# Patient Record
Sex: FEMALE | Race: WHITE | NOT HISPANIC OR LATINO | URBAN - METROPOLITAN AREA
[De-identification: names, ages, dates, MRNs, and addresses within clinical notes are randomized per-mention and may not be internally consistent; named-entity substitution may affect disease eponyms.]

---

## 2023-08-29 PROBLEM — R93.89 ENDOMETRIAL THICKENING ON ULTRASOUND: Status: ACTIVE | Noted: 2023-08-29

## 2023-08-29 PROBLEM — N19 RENAL FAILURE: Status: ACTIVE | Noted: 2023-08-29

## 2023-08-29 PROBLEM — E55.9 VITAMIN D DEFICIENCY: Status: ACTIVE | Noted: 2023-08-29

## 2023-08-29 PROBLEM — G37.9 DEMYELINATING DISEASE OF CENTRAL NERVOUS SYSTEM (MULTI): Status: ACTIVE | Noted: 2023-08-29

## 2023-08-29 PROBLEM — R41.3 MEMORY LOSS: Status: ACTIVE | Noted: 2023-08-29

## 2023-08-29 PROBLEM — M79.676 PAIN IN TOE: Status: ACTIVE | Noted: 2023-08-29

## 2023-08-29 PROBLEM — M10.9 GOUT: Status: ACTIVE | Noted: 2023-08-29

## 2023-08-29 PROBLEM — I63.9 CEREBROVASCULAR ACCIDENT (MULTI): Status: ACTIVE | Noted: 2023-08-29

## 2023-08-29 PROBLEM — N64.4 MASTODYNIA: Status: ACTIVE | Noted: 2023-08-29

## 2023-08-29 PROBLEM — E78.2 MIXED HYPERLIPIDEMIA: Status: ACTIVE | Noted: 2023-08-29

## 2023-08-29 PROBLEM — G43.009 COMMON MIGRAINE WITHOUT AURA: Status: ACTIVE | Noted: 2023-08-29

## 2023-08-29 PROBLEM — I10 ESSENTIAL (PRIMARY) HYPERTENSION: Status: ACTIVE | Noted: 2023-08-29

## 2023-08-29 PROBLEM — M54.50 LOWER BACK PAIN: Status: ACTIVE | Noted: 2023-08-29

## 2023-08-29 PROBLEM — N95.0 POSTMENOPAUSAL BLEEDING: Status: ACTIVE | Noted: 2023-08-29

## 2023-08-29 PROBLEM — Z04.9 CONDITION NOT FOUND: Status: ACTIVE | Noted: 2023-08-29

## 2023-08-29 PROBLEM — E11.65 TYPE 2 DIABETES MELLITUS WITH HYPERGLYCEMIA (MULTI): Status: ACTIVE | Noted: 2023-08-29

## 2023-08-29 RX ORDER — METFORMIN HYDROCHLORIDE 500 MG/1
500 TABLET ORAL
COMMUNITY
End: 2023-12-11 | Stop reason: WASHOUT

## 2023-08-29 RX ORDER — ALLOPURINOL 100 MG/1
100 TABLET ORAL DAILY
COMMUNITY
End: 2024-03-19 | Stop reason: WASHOUT

## 2023-08-29 RX ORDER — FUROSEMIDE 40 MG/1
40 TABLET ORAL DAILY
COMMUNITY
End: 2023-12-11 | Stop reason: WASHOUT

## 2023-08-29 RX ORDER — PIOGLITAZONEHYDROCHLORIDE 30 MG/1
TABLET ORAL
COMMUNITY
End: 2023-12-11 | Stop reason: WASHOUT

## 2023-08-29 RX ORDER — LANOLIN ALCOHOL/MO/W.PET/CERES
1 CREAM (GRAM) TOPICAL
COMMUNITY

## 2023-08-29 RX ORDER — CAPTOPRIL 12.5 MG/1
25 TABLET ORAL DAILY
COMMUNITY
Start: 2010-03-15

## 2023-08-29 RX ORDER — LOSARTAN POTASSIUM 100 MG/1
100 TABLET ORAL DAILY
COMMUNITY
End: 2023-12-11 | Stop reason: WASHOUT

## 2023-08-29 RX ORDER — ATORVASTATIN CALCIUM 40 MG/1
40 TABLET, FILM COATED ORAL DAILY
COMMUNITY
End: 2023-12-11 | Stop reason: WASHOUT

## 2023-08-29 RX ORDER — INSULIN GLARGINE 100 [IU]/ML
INJECTION, SOLUTION SUBCUTANEOUS
COMMUNITY
End: 2023-12-11 | Stop reason: WASHOUT

## 2023-08-29 RX ORDER — FERROUS GLUCONATE 324(38)MG
38 TABLET ORAL DAILY
COMMUNITY
End: 2023-12-11 | Stop reason: WASHOUT

## 2023-08-29 RX ORDER — COLCHICINE 0.6 MG/1
0.6 TABLET ORAL DAILY
COMMUNITY

## 2023-08-29 RX ORDER — FERROUS SULFATE 324(65)MG
1 TABLET, DELAYED RELEASE (ENTERIC COATED) ORAL DAILY
COMMUNITY
End: 2023-12-11 | Stop reason: WASHOUT

## 2023-08-29 RX ORDER — GLIMEPIRIDE 2 MG/1
2 TABLET ORAL 2 TIMES DAILY
COMMUNITY
End: 2023-12-11 | Stop reason: WASHOUT

## 2023-08-29 RX ORDER — OXYCODONE AND ACETAMINOPHEN 5; 325 MG/1; MG/1
1-2 TABLET ORAL EVERY 4 HOURS PRN
COMMUNITY

## 2023-08-29 RX ORDER — PIOGLITAZONEHYDROCHLORIDE 15 MG/1
15 TABLET ORAL DAILY
COMMUNITY
End: 2023-12-11 | Stop reason: WASHOUT

## 2023-08-29 RX ORDER — HYDROCODONE BITARTRATE AND ACETAMINOPHEN 5; 325 MG/1; MG/1
1 TABLET ORAL EVERY 8 HOURS PRN
COMMUNITY

## 2023-08-29 RX ORDER — FOLIC ACID 0.8 MG
1 TABLET ORAL DAILY
COMMUNITY

## 2023-08-29 RX ORDER — ASCORBIC ACID 500 MG
500 TABLET ORAL DAILY
COMMUNITY

## 2023-08-29 RX ORDER — SEMAGLUTIDE 1.34 MG/ML
0.5 INJECTION, SOLUTION SUBCUTANEOUS
COMMUNITY
Start: 2023-03-13 | End: 2023-12-11 | Stop reason: WASHOUT

## 2023-08-29 RX ORDER — PRAVASTATIN SODIUM 10 MG/1
10 TABLET ORAL DAILY
COMMUNITY
End: 2023-12-11 | Stop reason: WASHOUT

## 2023-08-29 RX ORDER — ACETAMINOPHEN 500 MG
1 TABLET ORAL DAILY
COMMUNITY

## 2023-08-29 RX ORDER — CLONIDINE HYDROCHLORIDE 0.2 MG/1
0.2 TABLET ORAL DAILY
COMMUNITY
Start: 2014-07-15 | End: 2024-03-19 | Stop reason: WASHOUT

## 2023-08-29 RX ORDER — METOPROLOL TARTRATE 50 MG/1
50 TABLET ORAL 2 TIMES DAILY
COMMUNITY
End: 2023-12-11 | Stop reason: WASHOUT

## 2023-08-29 RX ORDER — PEN NEEDLE, DIABETIC 30 GX3/16"
NEEDLE, DISPOSABLE MISCELLANEOUS
COMMUNITY

## 2023-08-29 RX ORDER — HYDRALAZINE HYDROCHLORIDE 25 MG/1
25 TABLET, FILM COATED ORAL 2 TIMES DAILY
COMMUNITY

## 2023-08-29 RX ORDER — BLOOD-GLUCOSE METER
EACH MISCELLANEOUS
COMMUNITY
Start: 2020-11-09

## 2023-08-29 RX ORDER — SUMATRIPTAN SUCCINATE 100 MG/1
100 TABLET ORAL
COMMUNITY
Start: 2014-07-15

## 2023-08-29 RX ORDER — DAPAGLIFLOZIN 10 MG/1
10 TABLET, FILM COATED ORAL DAILY
COMMUNITY
Start: 2023-03-13 | End: 2023-12-11 | Stop reason: WASHOUT

## 2023-08-29 RX ORDER — OMEPRAZOLE 40 MG/1
1 CAPSULE, DELAYED RELEASE ORAL DAILY
COMMUNITY

## 2023-08-29 RX ORDER — INSULIN LISPRO 100 [IU]/ML
INJECTION, SOLUTION INTRAVENOUS; SUBCUTANEOUS
COMMUNITY
End: 2023-12-11 | Stop reason: WASHOUT

## 2023-09-06 ENCOUNTER — HOSPITAL ENCOUNTER (OUTPATIENT)
Dept: DATA CONVERSION | Facility: HOSPITAL | Age: 63
Discharge: HOME | End: 2023-09-06
Payer: COMMERCIAL

## 2023-09-06 DIAGNOSIS — N64.4 MASTODYNIA: ICD-10-CM

## 2023-10-16 ENCOUNTER — TELEPHONE (OUTPATIENT)
Dept: ENDOCRINOLOGY | Facility: CLINIC | Age: 63
End: 2023-10-16
Payer: COMMERCIAL

## 2023-10-16 NOTE — TELEPHONE ENCOUNTER
Kathy KAUFFMAN [place]   1960   [id number]   [phone]     Called and spoke to patient in regards to canceling and rescheduling appt. However, patient direct me to call Emely [phone] to reschedule appt with but she was not available. Left a voice message for her to follow up with the office for scheduling an appt.

## 2023-11-10 ENCOUNTER — PHARMACY VISIT (OUTPATIENT)
Dept: PHARMACY | Facility: CLINIC | Age: 63
End: 2023-11-10
Payer: COMMERCIAL

## 2023-11-10 PROCEDURE — RXMED WILLOW AMBULATORY MEDICATION CHARGE

## 2023-11-10 RX ORDER — LOSARTAN POTASSIUM 100 MG/1
100 TABLET ORAL DAILY
Qty: 90 TABLET | Refills: 2 | Status: CANCELLED | OUTPATIENT
Start: 2023-11-10 | End: 2024-11-09

## 2023-11-13 DIAGNOSIS — N18.4 CHRONIC KIDNEY DISEASE, STAGE 4 (SEVERE) (MULTI): Primary | ICD-10-CM

## 2023-11-14 ENCOUNTER — LAB (OUTPATIENT)
Dept: LAB | Facility: LAB | Age: 63
End: 2023-11-14
Payer: COMMERCIAL

## 2023-11-14 DIAGNOSIS — N18.4 CHRONIC KIDNEY DISEASE, STAGE 4 (SEVERE) (MULTI): ICD-10-CM

## 2023-11-14 LAB
ALBUMIN SERPL BCP-MCNC: 3.9 G/DL (ref 3.4–5)
ANION GAP SERPL CALC-SCNC: 18 MMOL/L (ref 10–20)
BUN SERPL-MCNC: 27 MG/DL (ref 6–23)
CALCIUM SERPL-MCNC: 9.5 MG/DL (ref 8.6–10.3)
CHLORIDE SERPL-SCNC: 97 MMOL/L (ref 98–107)
CO2 SERPL-SCNC: 29 MMOL/L (ref 21–32)
CREAT SERPL-MCNC: 2.4 MG/DL (ref 0.5–1.05)
ERYTHROCYTE [DISTWIDTH] IN BLOOD BY AUTOMATED COUNT: 14.2 % (ref 11.5–14.5)
GFR SERPL CREATININE-BSD FRML MDRD: 22 ML/MIN/1.73M*2
GLUCOSE SERPL-MCNC: 262 MG/DL (ref 74–99)
HCT VFR BLD AUTO: 34.8 % (ref 36–46)
HGB BLD-MCNC: 11 G/DL (ref 12–16)
MCH RBC QN AUTO: 28.3 PG (ref 26–34)
MCHC RBC AUTO-ENTMCNC: 31.6 G/DL (ref 32–36)
MCV RBC AUTO: 90 FL (ref 80–100)
NRBC BLD-RTO: 0 /100 WBCS (ref 0–0)
PHOSPHATE SERPL-MCNC: 4 MG/DL (ref 2.5–4.9)
PLATELET # BLD AUTO: 197 X10*3/UL (ref 150–450)
POTASSIUM SERPL-SCNC: 4.5 MMOL/L (ref 3.5–5.3)
RBC # BLD AUTO: 3.89 X10*6/UL (ref 4–5.2)
SODIUM SERPL-SCNC: 139 MMOL/L (ref 136–145)
WBC # BLD AUTO: 7.5 X10*3/UL (ref 4.4–11.3)

## 2023-11-14 PROCEDURE — 36415 COLL VENOUS BLD VENIPUNCTURE: CPT

## 2023-11-14 PROCEDURE — 80069 RENAL FUNCTION PANEL: CPT

## 2023-11-14 PROCEDURE — 85027 COMPLETE CBC AUTOMATED: CPT

## 2023-11-17 ENCOUNTER — PHARMACY VISIT (OUTPATIENT)
Dept: PHARMACY | Facility: CLINIC | Age: 63
End: 2023-11-17
Payer: COMMERCIAL

## 2023-11-17 PROCEDURE — RXMED WILLOW AMBULATORY MEDICATION CHARGE

## 2023-11-17 PROCEDURE — RXOTC WILLOW AMBULATORY OTC CHARGE

## 2023-11-17 RX ORDER — LOSARTAN POTASSIUM 100 MG/1
100 TABLET ORAL DAILY
Qty: 90 TABLET | Refills: 2 | OUTPATIENT
Start: 2023-11-17

## 2023-11-22 ENCOUNTER — APPOINTMENT (OUTPATIENT)
Dept: ENDOCRINOLOGY | Facility: CLINIC | Age: 63
End: 2023-11-22
Payer: COMMERCIAL

## 2023-11-28 ENCOUNTER — APPOINTMENT (OUTPATIENT)
Dept: ENDOCRINOLOGY | Facility: CLINIC | Age: 63
End: 2023-11-28
Payer: COMMERCIAL

## 2023-12-06 PROCEDURE — RXMED WILLOW AMBULATORY MEDICATION CHARGE

## 2023-12-11 ENCOUNTER — CLINICAL SUPPORT (OUTPATIENT)
Dept: ENDOCRINOLOGY | Facility: CLINIC | Age: 63
End: 2023-12-11
Payer: COMMERCIAL

## 2023-12-11 VITALS
HEART RATE: 72 BPM | SYSTOLIC BLOOD PRESSURE: 133 MMHG | BODY MASS INDEX: 47.88 KG/M2 | WEIGHT: 266 LBS | DIASTOLIC BLOOD PRESSURE: 68 MMHG

## 2023-12-11 DIAGNOSIS — E78.2 MIXED HYPERLIPIDEMIA: ICD-10-CM

## 2023-12-11 DIAGNOSIS — I10 ESSENTIAL (PRIMARY) HYPERTENSION: ICD-10-CM

## 2023-12-11 DIAGNOSIS — E11.65 TYPE 2 DIABETES MELLITUS WITH HYPERGLYCEMIA, WITH LONG-TERM CURRENT USE OF INSULIN (MULTI): Primary | ICD-10-CM

## 2023-12-11 DIAGNOSIS — Z79.4 TYPE 2 DIABETES MELLITUS WITH HYPERGLYCEMIA, WITH LONG-TERM CURRENT USE OF INSULIN (MULTI): Primary | ICD-10-CM

## 2023-12-11 LAB — POC HEMOGLOBIN A1C: 11.5 % (ref 4.2–6.5)

## 2023-12-11 PROCEDURE — 99214 OFFICE O/P EST MOD 30 MIN: CPT | Performed by: INTERNAL MEDICINE

## 2023-12-11 PROCEDURE — RXMED WILLOW AMBULATORY MEDICATION CHARGE

## 2023-12-11 PROCEDURE — 83036 HEMOGLOBIN GLYCOSYLATED A1C: CPT | Performed by: INTERNAL MEDICINE

## 2023-12-11 PROCEDURE — 95251 CONT GLUC MNTR ANALYSIS I&R: CPT | Performed by: INTERNAL MEDICINE

## 2023-12-11 RX ORDER — SEMAGLUTIDE 1.34 MG/ML
1 INJECTION, SOLUTION SUBCUTANEOUS
Qty: 3 ML | Refills: 5 | Status: SHIPPED | OUTPATIENT
Start: 2023-12-11 | End: 2024-03-19 | Stop reason: DRUGHIGH

## 2023-12-11 NOTE — PATIENT INSTRUCTIONS
Finish up Ozempic 0.5mg,  then increase to 1mg weekly thereafter     Decrease Lantus to 40 units daily    Use Humalog 10 units before meals for the next week or so.  If not having any low sugars,  increase to 15 units before meals.  If still not having lows,  increase to 20 units before meals     AVOID SUGAR SWEETENED BEVERAGES,  diet/zero products are okay    Scan martinez often - when you wake up, before/after meals, bedtime, etc.   Follow scans closely, learning from scans which meals/snacks cause higher blood sugars or may require more/less mealtime insulin

## 2023-12-11 NOTE — PROGRESS NOTES
I, Dr Luisito Puente, have reviewed this progress note, medication list, vital signs, any pertinent lab values, and any CGM data if present with the [facility] and  face to face during this visit today. This note reflects the treatment plan that was made under my direction after reviewing the above mentioned elements while face to face with the patient and CDE.  I personally answered and addressed any questions and concerns the patient had during the visit today.  The CDE entered the data in this note under my direction and I personally reviewed it, signed any lab or medication orders that I instructed to be completed. I am the billing provider for this visit and the level of service was determined by my involvement in the Medical Decision Making Component of this visit while face to face with the patient.    Luisito Puente MD

## 2023-12-11 NOTE — PROGRESS NOTES
HPI     64 yo with dm2 (ckd, egfr-21 July 2023), htn, dyslipidemia, hemorrhagic stroke, eyesight better s/p cataract surgery both eyes since last visit.  Presents for follow up with  today.  A1c-11.5% today,  11.9% last visit.  Testing sugars 4 times/day, martinez 2 use. Rare low sugars. Able to afford meds. Struggling watching carbs in diet, trying to staying active around the house.         -has been drinking regular pop often 2 cans/day still           Taking Lantus 50 units qhs, humalog 40 units at meals -still hasn't been taking states d/t fear of lows, sonja 15mg (1/2) daily.  Taking ozempic 0.5mg weekly started last visit,  tolerating.  Does not tolerate metformin.  Does not recall using sglt2-i.           30 day libre2 data: 18% in range, 0% low, pattern: mid 200's overnight, upper 100's on waking, after eating 300's through the day, sugars can vary 200 pts at anytime.   Issue is diet and taking insulin with meals.  Dependant on food stamps and often eats whatever she is able to get affecting her sugars.           Pt taking atorvastatin 40mg for lipids and tolerating.           Pt on multiple bp meds (losartan 100mg, toprol 50mg bid, clonidine 0.2mg tid, lasix 40mg qd).           July 2023 sorian labs: hct-34.6%, cr-2.5 (egfr-21)        Current Outpatient Medications:     allopurinol (Zyloprim) 100 mg tablet, Take 1 tablet (100 mg) by mouth once daily., Disp: , Rfl:     ascorbic acid (Vitamin C) 500 mg tablet, Take 1 tablet (500 mg) by mouth once daily., Disp: , Rfl:     aspirin 81 mg capsule, Take 1 tablet by mouth once daily., Disp: , Rfl:     atorvastatin (Lipitor) 40 mg tablet, TAKE 1 TABLET BY MOUTH ONE TIME DAILY, Disp: 90 tablet, Rfl: 1    blood sugar diagnostic (OneTouch Verio test strips) strip, as directed 4 times daily, Disp: , Rfl:     captopril (Capoten) 12.5 mg tablet, Take 2 tablets (25 mg) by mouth once daily., Disp: , Rfl:     cholecalciferol (Vitamin D3) 50 mcg (2,000 unit) capsule,  Take 1 capsule (50 mcg) by mouth early in the morning.., Disp: , Rfl:     ciprofloxacin (Ciloxan) 0.3 % ophthalmic solution, PLACE 1 DROP INTO LEFT EYE 4 TIMES A DAY STARTING THE MORNING OF SURGERY, THEN AS DIRECTED AFTER SURGERY, Disp: 5 mL, Rfl: 1    cloNIDine (Catapres) 0.2 mg tablet, Take 1 tablet (0.2 mg) by mouth once daily., Disp: , Rfl:     colchicine 0.6 mg tablet, Take 1 tablet (0.6 mg) by mouth once daily., Disp: , Rfl:     cyanocobalamin (Vitamin B-12) 1,000 mcg tablet, Take 1 tablet (1,000 mcg) by mouth every other day., Disp: , Rfl:     docosahexaenoic acid/epa (FISH OIL ORAL), Take 1 tablet by mouth once daily. Until 3/19, then 2 tablets daily, Disp: , Rfl:     maria elena primrose/linoleic/g-lenic (PRIMROSE OIL ORAL), as directed, Disp: , Rfl:     ferrous gluconate (Fergon) 324 (38 Fe) mg tablet, TAKE 1 TABLET BY MOUTH ONCE DAILY IN THE MORNING. TAKE WITH MEALS, Disp: 30 tablet, Rfl: 11    folic acid (Folvite) 800 mcg tablet, Take 1 tablet (800 mcg) by mouth once daily., Disp: , Rfl:     furosemide (Lasix) 40 mg tablet, TAKE 1 TABLET BY MOUTH ONE TIME DAILY, Disp: 90 tablet, Rfl: 3    hydrALAZINE (Apresoline) 25 mg tablet, Take 1 tablet (25 mg) by mouth 2 times a day., Disp: , Rfl:     HYDROcodone-acetaminophen (Norco) 5-325 mg tablet, Take 1 tablet by mouth every 8 hours if needed (pain)., Disp: , Rfl:     insulin glargine (Lantus) 100 unit/mL (3 mL) pen, INJECT SUBCUTANEOUSLY UP TO 50 UNITS DAILY, Disp: 45 mL, Rfl: 1    insulin lispro (HumaLOG) 100 unit/mL injection, INJECT UP TO 50 UNITS SUBCUTANEOUSLY THREE TIMES A DAY, Disp: 135 mL, Rfl: 1    ketorolac (Acular) 0.5 % ophthalmic solution, PLACE 1 DROP INTO LEFT EYE 2 TIMES A DAY STARTING THE MORNING OF SURGERY, THEN AS DIRECTED AFTER SURGERY, Disp: 10 mL, Rfl: 1    losartan (Cozaar) 100 mg tablet, Take 1 tablet (100 mg) by mouth once daily., Disp: 90 tablet, Rfl: 2    metoprolol succinate XL (Toprol-XL) 50 mg 24 hr tablet, TAKE 1 TABLET BY MOUTH TWO  "TIMES A DAY, Disp: 180 tablet, Rfl: 1    omeprazole (PriLOSEC) 40 mg DR capsule, Take 1 capsule (40 mg) by mouth once daily., Disp: , Rfl:     ONETOUCH DELICA LANCETS MISC, use as directed 4 times daily, Disp: , Rfl:     oxyCODONE-acetaminophen (Percocet) 5-325 mg tablet, Take 1-2 tablets by mouth every 4 hours if needed (pain)., Disp: , Rfl:     pen needle, diabetic 32 gauge x 5/32\" needle, USE AS DIRECTED TO INJECT INSULIN FOUR TIMES A DAY, Disp: , Rfl:     pioglitazone (Actos) 15 mg tablet, TAKE 1/2 TABLET BY MOUTH ONE TIME DAILY, Disp: 45 tablet, Rfl: 1    prednisoLONE acetate (Pred-Forte) 1 % ophthalmic suspension, PLACE 1 DROP INTO LEFT EYE 4 TIMES A DAY STARTING THE MORNING OF SURGERY, THEN AS DIRECTED AFTER SURGERY, Disp: 10 mL, Rfl: 1    semaglutide 0.25 mg or 0.5 mg (2 mg/3 mL) pen injector, INJECT 0.5MG SUBCUTANEOUSLY EVERY WEEK, Disp: 3 mL, Rfl: 4    SUMAtriptan (Imitrex) 100 mg tablet, Take 1 tablet (100 mg) by mouth. At onset of migraine headache. Repeat in 2 hours if needed, Disp: , Rfl:       Allergies as of 12/11/2023 - Reviewed 12/11/2023   Allergen Reaction Noted    Morphine Other and Unknown 08/29/2023         /68   Pulse 72   Wt 121 kg (266 lb)   BMI 47.88 kg/m²       Labs:  Lab Results   Component Value Date    WBC 7.5 11/14/2023    NRBC 0.0 11/14/2023    RBC 3.89 (L) 11/14/2023    HGB 11.0 (L) 11/14/2023    HCT 34.8 (L) 11/14/2023     11/14/2023     Lab Results   Component Value Date    CALCIUM 9.5 11/14/2023    AST 17 10/06/2022    ALKPHOS 109 10/06/2022    BILITOT 0.5 10/06/2022    PROT 7.0 10/06/2022    ALBUMIN 3.9 11/14/2023    GLOB 3.2 05/03/2022    AGR 1.3 (L) 05/03/2022     11/14/2023    K 4.5 11/14/2023    CL 97 (L) 11/14/2023    CO2 29 11/14/2023    ANIONGAP 18 11/14/2023    BUN 27 (H) 11/14/2023    CREATININE 2.40 (H) 11/14/2023    UREACREAUR 11.2 07/27/2023    GLUCOSE 262 (H) 11/14/2023    ALT 22 10/06/2022    EGFR 22 (L) 11/14/2023     Lab Results   Component " "Value Date    CHOL 164 05/03/2022    TRIG 174 (H) 05/03/2022    HDL 46 (L) 05/03/2022    LDLCALC 83 05/03/2022     Lab Results   Component Value Date    MICROALBCREA 15.1 05/03/2022     No results found for: \"TSH\"  Lab Results   Component Value Date    PEODLTQM27 1,067 (H) 10/06/2022     Lab Results   Component Value Date    HGBA1C 11.5 (A) 12/11/2023         Assessment/Plan   1. Type 2 diabetes mellitus with hyperglycemia, with long-term current use of insulin (CMS/[facility])  Continue to uptitrate weekly glp1    Needs to take bolus insulin,  lower dose to help feel more comfortable dosing at meals      Decrease basal insulin to 40 units     - POCT glycosylated hemoglobin (Hb A1C) manually resulted    2. Essential (primary) hypertension  At target on therapy,  following with nephrology on regular basis     3. Mixed hyperlipidemia  On statin, tolerating       Treatment and plan discussed with Dr. Puente.  CHAY Meredith, PharmD, BC-ADM, [facility].   "

## 2023-12-14 ENCOUNTER — PHARMACY VISIT (OUTPATIENT)
Dept: PHARMACY | Facility: CLINIC | Age: 63
End: 2023-12-14
Payer: COMMERCIAL

## 2023-12-14 PROCEDURE — RXMED WILLOW AMBULATORY MEDICATION CHARGE

## 2023-12-16 RX ORDER — METOPROLOL SUCCINATE 50 MG/1
50 TABLET, EXTENDED RELEASE ORAL 2 TIMES DAILY
Qty: 180 TABLET | Refills: 1 | Status: CANCELLED | OUTPATIENT
Start: 2023-12-16 | End: 2024-12-15

## 2024-01-02 PROCEDURE — RXMED WILLOW AMBULATORY MEDICATION CHARGE

## 2024-01-04 ENCOUNTER — PHARMACY VISIT (OUTPATIENT)
Dept: PHARMACY | Facility: CLINIC | Age: 64
End: 2024-01-04
Payer: COMMERCIAL

## 2024-01-04 PROCEDURE — RXMED WILLOW AMBULATORY MEDICATION CHARGE

## 2024-01-30 DIAGNOSIS — E11.65 TYPE 2 DIABETES MELLITUS WITH HYPERGLYCEMIA, WITH LONG-TERM CURRENT USE OF INSULIN (MULTI): Primary | ICD-10-CM

## 2024-01-30 DIAGNOSIS — Z79.4 TYPE 2 DIABETES MELLITUS WITH HYPERGLYCEMIA, WITH LONG-TERM CURRENT USE OF INSULIN (MULTI): Primary | ICD-10-CM

## 2024-01-30 PROCEDURE — RXMED WILLOW AMBULATORY MEDICATION CHARGE

## 2024-01-30 RX ORDER — METOPROLOL SUCCINATE 50 MG/1
50 TABLET, EXTENDED RELEASE ORAL 2 TIMES DAILY
Qty: 180 TABLET | Refills: 1 | Status: CANCELLED | OUTPATIENT
Start: 2024-01-30 | End: 2025-01-29

## 2024-01-30 RX ORDER — PIOGLITAZONEHYDROCHLORIDE 15 MG/1
TABLET ORAL DAILY
Qty: 45 TABLET | Refills: 1 | Status: SHIPPED | OUTPATIENT
Start: 2024-01-30 | End: 2025-01-29

## 2024-01-30 RX ORDER — PIOGLITAZONEHYDROCHLORIDE 15 MG/1
TABLET ORAL DAILY
Qty: 45 TABLET | Refills: 1 | Status: CANCELLED | OUTPATIENT
Start: 2024-01-30 | End: 2025-01-29

## 2024-01-30 RX ORDER — ALLOPURINOL 100 MG/1
100 TABLET ORAL DAILY
Qty: 90 TABLET | Refills: 1 | Status: CANCELLED | OUTPATIENT
Start: 2024-01-30 | End: 2025-01-29

## 2024-01-31 ENCOUNTER — PHARMACY VISIT (OUTPATIENT)
Dept: PHARMACY | Facility: CLINIC | Age: 64
End: 2024-01-31
Payer: COMMERCIAL

## 2024-01-31 PROCEDURE — RXMED WILLOW AMBULATORY MEDICATION CHARGE

## 2024-02-29 PROCEDURE — RXMED WILLOW AMBULATORY MEDICATION CHARGE

## 2024-03-01 ENCOUNTER — PHARMACY VISIT (OUTPATIENT)
Dept: PHARMACY | Facility: CLINIC | Age: 64
End: 2024-03-01
Payer: COMMERCIAL

## 2024-03-01 PROCEDURE — RXMED WILLOW AMBULATORY MEDICATION CHARGE

## 2024-03-06 DIAGNOSIS — Z79.4 TYPE 2 DIABETES MELLITUS WITH HYPERGLYCEMIA, WITH LONG-TERM CURRENT USE OF INSULIN (MULTI): Primary | ICD-10-CM

## 2024-03-06 DIAGNOSIS — E11.65 TYPE 2 DIABETES MELLITUS WITH HYPERGLYCEMIA, WITH LONG-TERM CURRENT USE OF INSULIN (MULTI): Primary | ICD-10-CM

## 2024-03-06 PROCEDURE — RXMED WILLOW AMBULATORY MEDICATION CHARGE

## 2024-03-06 RX ORDER — INSULIN LISPRO 100 [IU]/ML
INJECTION, SOLUTION INTRAVENOUS; SUBCUTANEOUS
Qty: 135 ML | Refills: 1 | Status: SHIPPED | OUTPATIENT
Start: 2024-03-06 | End: 2025-03-06

## 2024-03-06 RX ORDER — INSULIN GLARGINE 100 [IU]/ML
INJECTION, SOLUTION SUBCUTANEOUS
Qty: 45 ML | Refills: 1 | Status: SHIPPED | OUTPATIENT
Start: 2024-03-06 | End: 2025-03-06

## 2024-03-06 RX ORDER — ATORVASTATIN CALCIUM 40 MG/1
40 TABLET, FILM COATED ORAL DAILY
Qty: 90 TABLET | Refills: 1 | Status: SHIPPED | OUTPATIENT
Start: 2024-03-06 | End: 2025-03-06

## 2024-03-07 ENCOUNTER — PHARMACY VISIT (OUTPATIENT)
Dept: PHARMACY | Facility: CLINIC | Age: 64
End: 2024-03-07
Payer: COMMERCIAL

## 2024-03-09 PROCEDURE — RXMED WILLOW AMBULATORY MEDICATION CHARGE

## 2024-03-11 ENCOUNTER — APPOINTMENT (OUTPATIENT)
Dept: ENDOCRINOLOGY | Facility: CLINIC | Age: 64
End: 2024-03-11
Payer: COMMERCIAL

## 2024-03-12 ENCOUNTER — PHARMACY VISIT (OUTPATIENT)
Dept: PHARMACY | Facility: CLINIC | Age: 64
End: 2024-03-12
Payer: COMMERCIAL

## 2024-03-18 ENCOUNTER — LAB (OUTPATIENT)
Dept: LAB | Facility: LAB | Age: 64
End: 2024-03-18
Payer: COMMERCIAL

## 2024-03-18 DIAGNOSIS — N18.4 CHRONIC KIDNEY DISEASE, STAGE 4 (SEVERE) (MULTI): Primary | ICD-10-CM

## 2024-03-18 LAB
ALBUMIN SERPL BCP-MCNC: 3.7 G/DL (ref 3.4–5)
ANION GAP SERPL CALC-SCNC: 22 MMOL/L (ref 10–20)
BUN SERPL-MCNC: 22 MG/DL (ref 6–23)
CALCIUM SERPL-MCNC: 9.1 MG/DL (ref 8.6–10.3)
CHLORIDE SERPL-SCNC: 94 MMOL/L (ref 98–107)
CO2 SERPL-SCNC: 25 MMOL/L (ref 21–32)
CREAT SERPL-MCNC: 2.5 MG/DL (ref 0.5–1.05)
EGFRCR SERPLBLD CKD-EPI 2021: 21 ML/MIN/1.73M*2
ERYTHROCYTE [DISTWIDTH] IN BLOOD BY AUTOMATED COUNT: 13.2 % (ref 11.5–14.5)
GLUCOSE SERPL-MCNC: 382 MG/DL (ref 74–99)
HCT VFR BLD AUTO: 37.1 % (ref 36–46)
HGB BLD-MCNC: 11.6 G/DL (ref 12–16)
MCH RBC QN AUTO: 29.4 PG (ref 26–34)
MCHC RBC AUTO-ENTMCNC: 31.3 G/DL (ref 32–36)
MCV RBC AUTO: 94 FL (ref 80–100)
NRBC BLD-RTO: 0 /100 WBCS (ref 0–0)
PHOSPHATE SERPL-MCNC: 3.7 MG/DL (ref 2.5–4.9)
PLATELET # BLD AUTO: 207 X10*3/UL (ref 150–450)
POTASSIUM SERPL-SCNC: 4.6 MMOL/L (ref 3.5–5.3)
RBC # BLD AUTO: 3.95 X10*6/UL (ref 4–5.2)
SODIUM SERPL-SCNC: 136 MMOL/L (ref 136–145)
WBC # BLD AUTO: 6.8 X10*3/UL (ref 4.4–11.3)

## 2024-03-18 PROCEDURE — 83970 ASSAY OF PARATHORMONE: CPT

## 2024-03-18 PROCEDURE — 85027 COMPLETE CBC AUTOMATED: CPT

## 2024-03-18 PROCEDURE — 80069 RENAL FUNCTION PANEL: CPT

## 2024-03-18 PROCEDURE — 36415 COLL VENOUS BLD VENIPUNCTURE: CPT

## 2024-03-19 ENCOUNTER — CLINICAL SUPPORT (OUTPATIENT)
Dept: ENDOCRINOLOGY | Facility: CLINIC | Age: 64
End: 2024-03-19
Payer: COMMERCIAL

## 2024-03-19 VITALS
SYSTOLIC BLOOD PRESSURE: 103 MMHG | DIASTOLIC BLOOD PRESSURE: 68 MMHG | WEIGHT: 257.2 LBS | HEART RATE: 75 BPM | BODY MASS INDEX: 46.29 KG/M2

## 2024-03-19 DIAGNOSIS — I10 ESSENTIAL (PRIMARY) HYPERTENSION: ICD-10-CM

## 2024-03-19 DIAGNOSIS — Z79.4 TYPE 2 DIABETES MELLITUS WITH HYPERGLYCEMIA, WITH LONG-TERM CURRENT USE OF INSULIN (MULTI): Primary | ICD-10-CM

## 2024-03-19 DIAGNOSIS — E78.2 MIXED HYPERLIPIDEMIA: ICD-10-CM

## 2024-03-19 DIAGNOSIS — E11.65 TYPE 2 DIABETES MELLITUS WITH HYPERGLYCEMIA, WITH LONG-TERM CURRENT USE OF INSULIN (MULTI): Primary | ICD-10-CM

## 2024-03-19 LAB
POC HEMOGLOBIN A1C: 13.2 % (ref 4.2–6.5)
PTH-INTACT SERPL-MCNC: 117.5 PG/ML (ref 18.5–88)

## 2024-03-19 PROCEDURE — 83036 HEMOGLOBIN GLYCOSYLATED A1C: CPT | Performed by: INTERNAL MEDICINE

## 2024-03-19 PROCEDURE — 95251 CONT GLUC MNTR ANALYSIS I&R: CPT | Performed by: INTERNAL MEDICINE

## 2024-03-19 PROCEDURE — 99214 OFFICE O/P EST MOD 30 MIN: CPT | Performed by: INTERNAL MEDICINE

## 2024-03-19 RX ORDER — SEMAGLUTIDE 2.68 MG/ML
2 INJECTION, SOLUTION SUBCUTANEOUS
Qty: 9 ML | Refills: 1 | Status: SHIPPED | OUTPATIENT
Start: 2024-03-19

## 2024-03-19 NOTE — PATIENT INSTRUCTIONS
Increase Ozempic to 2mg once weekly   - can use 2 shots 1mg dose pens to use up home supply first     Lantus 40 units once daily    Humalog 30 units BEFORE meals      Call Carlos to request switch to Leana 3     AVOID SUGAR SWEETENED BEVERAGES,  diet/zero products are okay

## 2024-03-19 NOTE — PROGRESS NOTES
HPI     62 yo with dm2 (ckd, egfr-21 July 2023), htn, dyslipidemia, hemorrhagic stroke, eyesight better s/p cataract surgery both eyes since last visit, presents for follow up.  A1c-13.2% today,  11.5% last visit.  Testing sugars 4 times/day, martinez 2 use. Rare low sugars. Able to afford meds. Struggling watching carbs in diet, trying to staying active around the house.  -has been drinking regular pop often 3 cans/day still    Taking Lantus 40 (decr from 50u) units qhs,  sonja 15mg (1/2) daily,  ozempic 1mg weekly incr last visit,  tolerating.    Still infreq taking humalog at meals,  reports typ using 1-2 times/week currently taking 30u before meal if/when remembers  - Does not tolerate metformin.  Does not recall using sglt2-i.    -14 days libre2 data reviewed & attached:  19% in range, 0% low, pattern: mid 200's overnight, upper 100's on waking, after eating 300's through the day, sugars can vary 200 pts at anytime.    - missing scans, infreq wearing sensor states d/t pain upon application ?   - Issue is diet and taking insulin with meals.  Dependant on food stamps and often eats whatever she is able to get affecting her sugars.    Pt taking atorvastatin 40mg for lipids and tolerating.    Pt on multiple bp meds (losartan 100mg, toprol 50mg bid, clonidine 0.2mg tid, lasix 40mg qd).    July 2023 sorian labs: hct-34.6%, cr-2.5 (egfr-21)        Current Outpatient Medications:     allopurinol (Zyloprim) 100 mg tablet, Take 1 tablet (100 mg) by mouth once daily., Disp: 90 tablet, Rfl: 3    ascorbic acid (Vitamin C) 500 mg tablet, Take 1 tablet (500 mg) by mouth once daily., Disp: , Rfl:     atorvastatin (Lipitor) 40 mg tablet, TAKE 1 TABLET BY MOUTH ONE TIME DAILY, Disp: 90 tablet, Rfl: 1    blood sugar diagnostic (OneTouch Verio test strips) strip, as directed 4 times daily, Disp: , Rfl:     captopril (Capoten) 12.5 mg tablet, Take 2 tablets (25 mg) by mouth once daily., Disp: , Rfl:     cholecalciferol (Vitamin D3) 50  mcg (2,000 unit) capsule, Take 1 capsule (50 mcg) by mouth early in the morning.., Disp: , Rfl:     ciprofloxacin (Ciloxan) 0.3 % ophthalmic solution, PLACE 1 DROP INTO LEFT EYE 4 TIMES A DAY STARTING THE MORNING OF SURGERY, THEN AS DIRECTED AFTER SURGERY, Disp: 5 mL, Rfl: 1    cloNIDine (Catapres) 0.2 mg tablet, Take 1 tablet (0.2 mg) by mouth 3 times a day., Disp: 270 tablet, Rfl: 2    colchicine 0.6 mg tablet, Take 1 tablet (0.6 mg) by mouth once daily., Disp: , Rfl:     cyanocobalamin (Vitamin B-12) 1,000 mcg tablet, Take 1 tablet (1,000 mcg) by mouth every other day., Disp: , Rfl:     docosahexaenoic acid/epa (FISH OIL ORAL), Take 1 tablet by mouth once daily. Until 3/19, then 2 tablets daily, Disp: , Rfl:     ferrous gluconate (Fergon) 324 (38 Fe) mg tablet, TAKE 1 TABLET BY MOUTH ONCE DAILY IN THE MORNING. TAKE WITH MEALS, Disp: 30 tablet, Rfl: 11    folic acid (Folvite) 800 mcg tablet, Take 1 tablet (800 mcg) by mouth once daily., Disp: , Rfl:     furosemide (Lasix) 40 mg tablet, TAKE 1 TABLET BY MOUTH ONE TIME DAILY, Disp: 90 tablet, Rfl: 3    hydrALAZINE (Apresoline) 25 mg tablet, Take 1 tablet (25 mg) by mouth 2 times a day., Disp: , Rfl:     HYDROcodone-acetaminophen (Norco) 5-325 mg tablet, Take 1 tablet by mouth every 8 hours if needed (pain)., Disp: , Rfl:     insulin glargine (Lantus Solostar U-100 Insulin) 100 unit/mL (3 mL) pen, INJECT SUBCUTANEOUSLY UP TO 50 UNITS DAILY, Disp: 45 mL, Rfl: 1    insulin lispro (HumaLOG KwikPen Insulin) 100 unit/mL injection, INJECT UP TO 50 UNITS SUBCUTANEOUSLY THREE TIMES A DAY, Disp: 135 mL, Rfl: 1    ketorolac (Acular) 0.5 % ophthalmic solution, PLACE 1 DROP INTO LEFT EYE 2 TIMES A DAY STARTING THE MORNING OF SURGERY, THEN AS DIRECTED AFTER SURGERY, Disp: 10 mL, Rfl: 1    losartan (Cozaar) 100 mg tablet, Take 1 tablet (100 mg) by mouth once daily., Disp: 90 tablet, Rfl: 2    metoprolol succinate XL (Toprol-XL) 50 mg 24 hr tablet, Take 1 tablet (50 mg) by mouth 2  "times a day., Disp: 90 tablet, Rfl: 3    omeprazole (PriLOSEC) 40 mg DR capsule, Take 1 capsule (40 mg) by mouth once daily., Disp: , Rfl:     ONETOUCH DELICA LANCETS MISC, use as directed 4 times daily, Disp: , Rfl:     oxyCODONE-acetaminophen (Percocet) 5-325 mg tablet, Take 1-2 tablets by mouth every 4 hours if needed (pain)., Disp: , Rfl:     pen needle, diabetic 32 gauge x 5/32\" needle, USE AS DIRECTED TO INJECT INSULIN FOUR TIMES A DAY, Disp: , Rfl:     pioglitazone (Actos) 15 mg tablet, TAKE 1/2 TABLET BY MOUTH ONE TIME DAILY, Disp: 45 tablet, Rfl: 1    prednisoLONE acetate (Pred-Forte) 1 % ophthalmic suspension, PLACE 1 DROP INTO LEFT EYE 4 TIMES A DAY STARTING THE MORNING OF SURGERY, THEN AS DIRECTED AFTER SURGERY, Disp: 10 mL, Rfl: 1    semaglutide (Ozempic) 1 mg/dose (4 mg/3 mL) pen injector, Inject 1 mg under the skin 1 (one) time per week., Disp: 3 mL, Rfl: 5    SUMAtriptan (Imitrex) 100 mg tablet, Take 1 tablet (100 mg) by mouth. At onset of migraine headache. Repeat in 2 hours if needed, Disp: , Rfl:     Allergies as of 03/19/2024 - Reviewed 03/19/2024   Allergen Reaction Noted    Morphine Other and Unknown 08/29/2023       /68   Pulse 75   Wt 117 kg (257 lb 3.2 oz)   BMI 46.29 kg/m²     Labs:   Lab Results   Component Value Date    WBC 6.8 03/18/2024    NRBC 0.0 03/18/2024    RBC 3.95 (L) 03/18/2024    HGB 11.6 (L) 03/18/2024    HCT 37.1 03/18/2024     03/18/2024     Lab Results   Component Value Date    CALCIUM 9.1 03/18/2024    AST 17 10/06/2022    ALKPHOS 109 10/06/2022    BILITOT 0.5 10/06/2022    PROT 7.0 10/06/2022    ALBUMIN 3.7 03/18/2024    GLOB 3.2 05/03/2022    AGR 1.3 (L) 05/03/2022     03/18/2024    K 4.6 03/18/2024    CL 94 (L) 03/18/2024    CO2 25 03/18/2024    ANIONGAP 22 (H) 03/18/2024    BUN 22 03/18/2024    CREATININE 2.50 (H) 03/18/2024    UREACREAUR 11.2 07/27/2023    GLUCOSE 382 (H) 03/18/2024    ALT 22 10/06/2022    EGFR 21 (L) 03/18/2024     Lab Results " "  Component Value Date    CHOL 164 05/03/2022    TRIG 174 (H) 05/03/2022    HDL 46 (L) 05/03/2022    LDLCALC 83 05/03/2022     Lab Results   Component Value Date    MICROALBCREA 15.1 05/03/2022     No results found for: \"TSH\"  Lab Results   Component Value Date    [id number] 1,067 (H) 10/06/2022     Lab Results   Component Value Date    HGBA1C 13.2 (A) 03/19/2024       Assessment/Plan   1. Type 2 diabetes mellitus with hyperglycemia, with long-term current use of insulin (CMS/[facility])    -A1c ordered & reviewed today  -reviewed 14d libreview data    As with previous visits,  poor compliance with insulin / diet     Needs to stop regular soda & remember to take bolus insulin with each meal no exceptions     Continue to uptitrate weekly glp1 to 2mg     Would benefit from switch to martinez 3,  [facility]   -Patient has been using a continuous glucose monitor and performing frequent testing (4 or more times daily); she is insulin treated with multiple daily injections and the treatment regimen requires frequent adjustment based on blood sugars. CGM is medically necessary.  Patient is adherent to diabetic treatment plan and participates in ongoing education and support. She is also motivated and knowledgeable about the use of CGM.    2. Essential (primary) hypertension  At target on therapy,  following with nephrology on regular basis     3. Mixed hyperlipidemia  On statin, tolerating       Follow up: 4mon BB    -labs/tests/notes reviewed  -reviewed and counseled patient on medication monitoring and side effects    Treatment and plan discussed with Dr. Puente.  CHAY Meredith, PharmD, BC-ADM, [facility].     Medical Decision Making  Complexity of problem: Chronic illness of diabetes mellitus uncontrolled, progressing  Data analyzed and reviewed: Reviewed prior notes, blood glucose data, labs including HgbA1c, lipids, serum chemistries.  Ordered tests.   Risk of complications and morbidities: Is definite because of " use of insulin and risk of hypoglycemia.  Prescription medications reviewed and modifications made.  Compliance assessed.  Addressed social determinants of health including food insecurity.

## 2024-03-20 PROCEDURE — RXMED WILLOW AMBULATORY MEDICATION CHARGE

## 2024-03-20 NOTE — PROGRESS NOTES
I, Dr Luisito Puente, have reviewed this progress note, medication list, vital signs, any pertinent lab values, and any CGM data if present with the [facility] and  face to face during this visit today. This note reflects the treatment plan that was made under my direction after reviewing the above mentioned elements while face to face with the patient and CDE.  I personally answered and addressed any questions and concerns the patient had during the visit today.  The CDE entered the data in this note under my direction and I personally reviewed it, signed any lab or medication orders that I instructed to be completed. I am the billing provider for this visit and the level of service was determined by my involvement in the Medical Decision Making Component of this visit while face to face with the patient.    Electronically signed by Luisito Puente MD on 3/20/24 at 7:47 AM.

## 2024-03-21 ENCOUNTER — PHARMACY VISIT (OUTPATIENT)
Dept: PHARMACY | Facility: CLINIC | Age: 64
End: 2024-03-21
Payer: COMMERCIAL

## 2024-04-27 PROCEDURE — RXMED WILLOW AMBULATORY MEDICATION CHARGE

## 2024-04-29 ENCOUNTER — PHARMACY VISIT (OUTPATIENT)
Dept: PHARMACY | Facility: CLINIC | Age: 64
End: 2024-04-29
Payer: COMMERCIAL

## 2024-04-29 PROCEDURE — RXMED WILLOW AMBULATORY MEDICATION CHARGE

## 2024-05-08 PROCEDURE — RXMED WILLOW AMBULATORY MEDICATION CHARGE

## 2024-05-09 ENCOUNTER — PHARMACY VISIT (OUTPATIENT)
Dept: PHARMACY | Facility: CLINIC | Age: 64
End: 2024-05-09
Payer: COMMERCIAL

## 2024-06-04 ENCOUNTER — PHARMACY VISIT (OUTPATIENT)
Dept: PHARMACY | Facility: CLINIC | Age: 64
End: 2024-06-04
Payer: COMMERCIAL

## 2024-06-04 PROCEDURE — RXMED WILLOW AMBULATORY MEDICATION CHARGE

## 2024-06-04 RX ORDER — FUROSEMIDE 40 MG/1
40 TABLET ORAL DAILY
Qty: 90 TABLET | Refills: 3 | Status: CANCELLED | OUTPATIENT
Start: 2024-06-04 | End: 2025-06-04

## 2024-06-05 ENCOUNTER — PHARMACY VISIT (OUTPATIENT)
Dept: PHARMACY | Facility: CLINIC | Age: 64
End: 2024-06-05

## 2024-06-06 PROCEDURE — RXMED WILLOW AMBULATORY MEDICATION CHARGE

## 2024-06-08 ENCOUNTER — PHARMACY VISIT (OUTPATIENT)
Dept: PHARMACY | Facility: CLINIC | Age: 64
End: 2024-06-08
Payer: COMMERCIAL

## 2024-06-11 PROCEDURE — RXMED WILLOW AMBULATORY MEDICATION CHARGE

## 2024-06-12 ENCOUNTER — PHARMACY VISIT (OUTPATIENT)
Dept: PHARMACY | Facility: CLINIC | Age: 64
End: 2024-06-12
Payer: COMMERCIAL

## 2024-06-25 DIAGNOSIS — Z79.4 TYPE 2 DIABETES MELLITUS WITH HYPERGLYCEMIA, WITH LONG-TERM CURRENT USE OF INSULIN (MULTI): Primary | ICD-10-CM

## 2024-06-25 DIAGNOSIS — E11.65 TYPE 2 DIABETES MELLITUS WITH HYPERGLYCEMIA, WITH LONG-TERM CURRENT USE OF INSULIN (MULTI): Primary | ICD-10-CM

## 2024-06-25 PROCEDURE — RXMED WILLOW AMBULATORY MEDICATION CHARGE

## 2024-06-25 RX ORDER — FERROUS GLUCONATE 325 MG
TABLET ORAL
Qty: 30 TABLET | Refills: 11 | Status: SHIPPED | OUTPATIENT
Start: 2024-06-25 | End: 2025-06-25

## 2024-06-26 ENCOUNTER — PHARMACY VISIT (OUTPATIENT)
Dept: PHARMACY | Facility: CLINIC | Age: 64
End: 2024-06-26
Payer: COMMERCIAL

## 2024-06-26 PROCEDURE — RXMED WILLOW AMBULATORY MEDICATION CHARGE

## 2024-07-16 ENCOUNTER — APPOINTMENT (OUTPATIENT)
Dept: ENDOCRINOLOGY | Facility: CLINIC | Age: 64
End: 2024-07-16
Payer: COMMERCIAL

## 2024-07-16 PROBLEM — Z79.4 TYPE 2 DIABETES MELLITUS WITH HYPERGLYCEMIA, WITH LONG-TERM CURRENT USE OF INSULIN (MULTI): Status: ACTIVE | Noted: 2023-08-29

## 2024-07-16 PROCEDURE — RXMED WILLOW AMBULATORY MEDICATION CHARGE

## 2024-07-17 ENCOUNTER — TELEPHONE (OUTPATIENT)
Dept: ENDOCRINOLOGY | Facility: CLINIC | Age: 64
End: 2024-07-17
Payer: COMMERCIAL

## 2024-07-17 ENCOUNTER — PHARMACY VISIT (OUTPATIENT)
Dept: PHARMACY | Facility: CLINIC | Age: 64
End: 2024-07-17
Payer: COMMERCIAL

## 2024-07-17 NOTE — TELEPHONE ENCOUNTER
Kathy KAUFFMAN [place]   1960   [id number]   [phone]       Called patient in regards to canceling 7/16/24 appt due to not feeling well. Advised patient to call office to reschedule appt.

## 2024-07-26 ENCOUNTER — PHARMACY VISIT (OUTPATIENT)
Dept: PHARMACY | Facility: CLINIC | Age: 64
End: 2024-07-26
Payer: COMMERCIAL

## 2024-07-26 PROCEDURE — RXMED WILLOW AMBULATORY MEDICATION CHARGE

## 2024-08-06 DIAGNOSIS — Z79.4 TYPE 2 DIABETES MELLITUS WITH HYPERGLYCEMIA, WITH LONG-TERM CURRENT USE OF INSULIN (MULTI): ICD-10-CM

## 2024-08-06 DIAGNOSIS — E11.65 TYPE 2 DIABETES MELLITUS WITH HYPERGLYCEMIA, WITH LONG-TERM CURRENT USE OF INSULIN (MULTI): ICD-10-CM

## 2024-08-06 PROCEDURE — RXMED WILLOW AMBULATORY MEDICATION CHARGE

## 2024-08-06 RX ORDER — PIOGLITAZONEHYDROCHLORIDE 15 MG/1
TABLET ORAL DAILY
Qty: 45 TABLET | Refills: 1 | Status: SHIPPED | OUTPATIENT
Start: 2024-08-06 | End: 2025-08-06

## 2024-08-07 ENCOUNTER — PHARMACY VISIT (OUTPATIENT)
Dept: PHARMACY | Facility: CLINIC | Age: 64
End: 2024-08-07
Payer: COMMERCIAL

## 2024-08-19 PROCEDURE — RXMED WILLOW AMBULATORY MEDICATION CHARGE

## 2024-08-20 ENCOUNTER — PHARMACY VISIT (OUTPATIENT)
Dept: PHARMACY | Facility: CLINIC | Age: 64
End: 2024-08-20
Payer: COMMERCIAL

## 2024-08-20 RX ORDER — LOSARTAN POTASSIUM 100 MG/1
100 TABLET ORAL DAILY
Qty: 90 TABLET | Refills: 2 | OUTPATIENT
Start: 2024-08-20

## 2024-08-28 PROCEDURE — RXMED WILLOW AMBULATORY MEDICATION CHARGE

## 2024-08-29 DIAGNOSIS — E11.65 TYPE 2 DIABETES MELLITUS WITH HYPERGLYCEMIA, WITH LONG-TERM CURRENT USE OF INSULIN (MULTI): ICD-10-CM

## 2024-08-29 DIAGNOSIS — N18.4 CHRONIC KIDNEY DISEASE, STAGE 4 (SEVERE) (MULTI): Primary | ICD-10-CM

## 2024-08-29 DIAGNOSIS — Z79.4 TYPE 2 DIABETES MELLITUS WITH HYPERGLYCEMIA, WITH LONG-TERM CURRENT USE OF INSULIN (MULTI): ICD-10-CM

## 2024-08-29 PROCEDURE — RXMED WILLOW AMBULATORY MEDICATION CHARGE

## 2024-08-29 RX ORDER — SEMAGLUTIDE 2.68 MG/ML
2 INJECTION, SOLUTION SUBCUTANEOUS
Qty: 9 ML | Refills: 1 | Status: SHIPPED | OUTPATIENT
Start: 2024-09-01

## 2024-08-29 NOTE — PROGRESS NOTES
HPI   63 yo with dm2 (ckd, egfr-21 July 2023), htn, dyslipidemia, hemorrhagic stroke, eyesight better s/p cataract surgery both eyes since last visit, presents for follow up.  A1c-13.2% last visit, 10.8% last visit.      Testing sugars 4 times/day, martinez 2 use. Rare low sugars. Able to afford meds. Struggling watching carbs in diet, trying to staying active around the house.  -has been drinking regular pop often 3 cans/day still     Taking Lantus 40  units qhs,  sonja 15mg (1/2) daily,  ozempic 2mg weekly.  Taking humalog 20 units small meal/40 units larger meal (compliance is issue)  - Does not tolerate metformin.    Does not recall using sglt2-i.     -30 days libre2 data reviewed: 46% in range, 0% low, pattern mid 200's overnight, upper 100's/low 200's on waking and through the day, after dinner mid 200's into bedtime.      - Issue is diet and taking insulin with meals.  Dependant on food stamps and often eats whatever she is able to get affecting her sugars.     Pt taking atorvastatin 40mg for lipids and tolerating.     Pt on multiple bp meds (losartan 100mg, toprol 50mg bid, clonidine 0.2mg tid, lasix 40mg qd).       Current Outpatient Medications:     allopurinol (Zyloprim) 100 mg tablet, Take 1 tablet (100 mg) by mouth once daily., Disp: 90 tablet, Rfl: 3    atorvastatin (Lipitor) 40 mg tablet, TAKE 1 TABLET BY MOUTH ONE TIME DAILY, Disp: 90 tablet, Rfl: 1    blood sugar diagnostic (OneTouch Verio test strips) strip, as directed 4 times daily, Disp: , Rfl:     captopril (Capoten) 12.5 mg tablet, Take 2 tablets (25 mg) by mouth once daily., Disp: , Rfl:     cholecalciferol (Vitamin D3) 50 mcg (2,000 unit) capsule, Take 1 capsule (50 mcg) by mouth early in the morning.., Disp: , Rfl:     ciprofloxacin (Ciloxan) 0.3 % ophthalmic solution, PLACE 1 DROP INTO LEFT EYE 4 TIMES A DAY STARTING THE MORNING OF SURGERY, THEN AS DIRECTED AFTER SURGERY, Disp: 5 mL, Rfl: 1    cloNIDine (Catapres) 0.2 mg tablet, Take 1 tablet  "(0.2 mg) by mouth 3 times a day., Disp: 270 tablet, Rfl: 2    cyanocobalamin (Vitamin B-12) 1,000 mcg tablet, Take 1 tablet (1,000 mcg) by mouth every other day., Disp: , Rfl:     ferrous gluconate (Fergon) 324 (38 Fe) mg tablet, TAKE 1 TABLET BY MOUTH ONCE DAILY IN THE MORNING. TAKE WITH MEALS, Disp: 30 tablet, Rfl: 11    furosemide (Lasix) 40 mg tablet, Take 1 tablet (40 mg) by mouth once daily., Disp: 90 tablet, Rfl: 2    hydrALAZINE (Apresoline) 25 mg tablet, Take 1 tablet (25 mg) by mouth 2 times a day., Disp: , Rfl:     insulin glargine (Lantus Solostar U-100 Insulin) 100 unit/mL (3 mL) pen, INJECT SUBCUTANEOUSLY UP TO 50 UNITS DAILY, Disp: 45 mL, Rfl: 1    insulin lispro (HumaLOG KwikPen Insulin) 100 unit/mL injection, INJECT UP TO 50 UNITS SUBCUTANEOUSLY THREE TIMES A DAY, Disp: 135 mL, Rfl: 1    losartan (Cozaar) 100 mg tablet, Take 1 tablet (100 mg) by mouth once daily., Disp: 90 tablet, Rfl: 2    metoprolol succinate XL (Toprol-XL) 50 mg 24 hr tablet, Take 1 tablet (50 mg) by mouth 2 times a day., Disp: 180 tablet, Rfl: 2    omeprazole (PriLOSEC) 40 mg DR capsule, Take 1 capsule (40 mg) by mouth once daily., Disp: , Rfl:     ONETOUCH DELICA LANCETS MISC, use as directed 4 times daily, Disp: , Rfl:     pen needle, diabetic 32 gauge x 5/32\" needle, USE AS DIRECTED TO INJECT INSULIN FOUR TIMES A DAY, Disp: , Rfl:     pioglitazone (Actos) 15 mg tablet, TAKE 1/2 TABLET BY MOUTH ONE TIME DAILY, Disp: 45 tablet, Rfl: 1    [START ON 9/1/2024] semaglutide (Ozempic) 2 mg/dose (8 mg/3 mL) pen injector, Inject 2 mg under the skin 1 (one) time per week., Disp: 9 mL, Rfl: 1    ascorbic acid (Vitamin C) 500 mg tablet, Take 1 tablet (500 mg) by mouth once daily., Disp: , Rfl:     colchicine 0.6 mg tablet, Take 1 tablet (0.6 mg) by mouth once daily., Disp: , Rfl:     docosahexaenoic acid/epa (FISH OIL ORAL), Take 1 tablet by mouth once daily. Until 3/19, then 2 tablets daily, Disp: , Rfl:     folic acid (Folvite) 800 mcg " "tablet, Take 1 tablet (800 mcg) by mouth once daily., Disp: , Rfl:     HYDROcodone-acetaminophen (Norco) 5-325 mg tablet, Take 1 tablet by mouth every 8 hours if needed (pain)., Disp: , Rfl:     ketorolac (Acular) 0.5 % ophthalmic solution, PLACE 1 DROP INTO LEFT EYE 2 TIMES A DAY STARTING THE MORNING OF SURGERY, THEN AS DIRECTED AFTER SURGERY (Patient not taking: Reported on 8/30/2024), Disp: 10 mL, Rfl: 1    oxyCODONE-acetaminophen (Percocet) 5-325 mg tablet, Take 1-2 tablets by mouth every 4 hours if needed (pain)., Disp: , Rfl:     prednisoLONE acetate (Pred-Forte) 1 % ophthalmic suspension, PLACE 1 DROP INTO LEFT EYE 4 TIMES A DAY STARTING THE MORNING OF SURGERY, THEN AS DIRECTED AFTER SURGERY (Patient not taking: Reported on 8/30/2024), Disp: 10 mL, Rfl: 1    SUMAtriptan (Imitrex) 100 mg tablet, Take 1 tablet (100 mg) by mouth. At onset of migraine headache. Repeat in 2 hours if needed, Disp: , Rfl:       Allergies as of 08/30/2024 - Reviewed 08/30/2024   Allergen Reaction Noted    Morphine Other and Unknown 08/29/2023         Review of Systems   Cardiology: Lightheadedness-denies.  Chest pain-denies.  Leg edema-denies.  Palpitations-denies.  Respiratory: Cough-denies. Shortness of breath-denies.  Wheezing-denies.  Gastroenterology: Constipation-denies.  Diarrhea-denies.  Heartburn-denies.  Endocrinology: Cold intolerance-denies.  Heat intolerance-denies.  Sweats-denies.  Neurology: Headache-denies.  Tremor-denies.  Neuropathy in extremities-denies.  Psychology: Low energy-denies.  Irritability-denies.  Sleep disturbances-denies.      /72   Pulse 79   Ht 1.6 m (5' 3\")   Wt 108 kg (238 lb 12.8 oz)   BMI 42.30 kg/m²       Labs:  Lab Results   Component Value Date    WBC 6.8 03/18/2024    NRBC 0.0 03/18/2024    RBC 3.95 (L) 03/18/2024    HGB 11.6 (L) 03/18/2024    HCT 37.1 03/18/2024     03/18/2024     Lab Results   Component Value Date    CALCIUM 9.1 03/18/2024    AST 17 10/06/2022    ALKPHOS " "109 10/06/2022    BILITOT 0.5 10/06/2022    PROT 7.0 10/06/2022    ALBUMIN 3.7 03/18/2024    GLOB 3.2 05/03/2022    AGR 1.3 (L) 05/03/2022     03/18/2024    K 4.6 03/18/2024    CL 94 (L) 03/18/2024    CO2 25 03/18/2024    ANIONGAP 22 (H) 03/18/2024    BUN 22 03/18/2024    CREATININE 2.50 (H) 03/18/2024    UREACREAUR 11.2 07/27/2023    GLUCOSE 382 (H) 03/18/2024    ALT 22 10/06/2022    EGFR 21 (L) 03/18/2024     Lab Results   Component Value Date    CHOL 164 05/03/2022    TRIG 174 (H) 05/03/2022    HDL 46 (L) 05/03/2022    LDLCALC 83 05/03/2022     Lab Results   Component Value Date    MICROALBCREA 15.1 05/03/2022     No results found for: \"TSH\"  Lab Results   Component Value Date    SCPMQSOQ32 1,067 (H) 10/06/2022     Lab Results   Component Value Date    HGBA1C 10.8 (A) 08/30/2024         Physical Exam   General Appearance: pleasant, cooperative, no acute distress  HEENT: no chemosis, no proptosis, no lid lag, no lid retraction  Neck: no lymphadenopathy, no thyromegaly, no dominant thyroid nodules  Heart: no murmurs, regular rate and rhythm, S1 and S2  Lungs: no wheezes, no rhonci, no rales  Extremities: no lower extremity swelling      Assessment/Plan   1. Type 2 diabetes mellitus with hyperglycemia, with long-term current use of insulin (Multi)  -A1c ordered and reviewed  -martinez data reviewed  -labs reviewed    -improved on ozempic 2mg since last visit with wt loss and better A1c    -needs to do better with scanning martinez through the day, missing the majority of data after dinner    -work on dosing at least 40 units at dinner    2. Essential (primary) hypertension  -at target on therapy  -pt following with kidney doctor    3. Mixed hyperlipidemia  -on statin and tolerating, follow labs yearly         Follow Up:  Shelly Granados 3-4 months    Medical Decision Making  Complexity of problem: Chronic illness of diabetes mellitus uncontrolled, progressing  Data analyzed and reviewed: Reviewed prior notes, blood " glucose data, labs including HgbA1c, lipids, serum chemistries.  Ordered tests.   Risk of complications and morbidities: Is definite because of use of insulin and risk of hypoglycemia.  Prescription medications reviewed and modifications made.  Compliance assessed.  Addressed social determinants of health including food insecurity.

## 2024-08-30 ENCOUNTER — APPOINTMENT (OUTPATIENT)
Dept: ENDOCRINOLOGY | Facility: CLINIC | Age: 64
End: 2024-08-30
Payer: COMMERCIAL

## 2024-08-30 ENCOUNTER — PHARMACY VISIT (OUTPATIENT)
Dept: PHARMACY | Facility: CLINIC | Age: 64
End: 2024-08-30
Payer: COMMERCIAL

## 2024-08-30 VITALS
HEART RATE: 79 BPM | WEIGHT: 238.8 LBS | SYSTOLIC BLOOD PRESSURE: 109 MMHG | HEIGHT: 63 IN | DIASTOLIC BLOOD PRESSURE: 72 MMHG | BODY MASS INDEX: 42.31 KG/M2

## 2024-08-30 DIAGNOSIS — E78.2 MIXED HYPERLIPIDEMIA: ICD-10-CM

## 2024-08-30 DIAGNOSIS — Z79.4 TYPE 2 DIABETES MELLITUS WITH HYPERGLYCEMIA, WITH LONG-TERM CURRENT USE OF INSULIN (MULTI): Primary | ICD-10-CM

## 2024-08-30 DIAGNOSIS — I10 ESSENTIAL (PRIMARY) HYPERTENSION: ICD-10-CM

## 2024-08-30 DIAGNOSIS — E11.65 TYPE 2 DIABETES MELLITUS WITH HYPERGLYCEMIA, WITH LONG-TERM CURRENT USE OF INSULIN (MULTI): Primary | ICD-10-CM

## 2024-08-30 LAB — POC HEMOGLOBIN A1C: 10.8 % (ref 4.2–6.5)

## 2024-08-30 PROCEDURE — 3074F SYST BP LT 130 MM HG: CPT | Performed by: INTERNAL MEDICINE

## 2024-08-30 PROCEDURE — 99214 OFFICE O/P EST MOD 30 MIN: CPT | Performed by: INTERNAL MEDICINE

## 2024-08-30 PROCEDURE — 1036F TOBACCO NON-USER: CPT | Performed by: INTERNAL MEDICINE

## 2024-08-30 PROCEDURE — 4010F ACE/ARB THERAPY RXD/TAKEN: CPT | Performed by: INTERNAL MEDICINE

## 2024-08-30 PROCEDURE — 3078F DIAST BP <80 MM HG: CPT | Performed by: INTERNAL MEDICINE

## 2024-08-30 PROCEDURE — 83036 HEMOGLOBIN GLYCOSYLATED A1C: CPT | Performed by: INTERNAL MEDICINE

## 2024-08-30 PROCEDURE — 3008F BODY MASS INDEX DOCD: CPT | Performed by: INTERNAL MEDICINE

## 2024-08-30 RX ORDER — LANOLIN ALCOHOL/MO/W.PET/CERES
1000 CREAM (GRAM) TOPICAL DAILY
Qty: 90 TABLET | Refills: 3 | Status: SHIPPED | OUTPATIENT
Start: 2024-08-30

## 2024-08-30 ASSESSMENT — PATIENT HEALTH QUESTIONNAIRE - PHQ9
1. LITTLE INTEREST OR PLEASURE IN DOING THINGS: NOT AT ALL
2. FEELING DOWN, DEPRESSED OR HOPELESS: NOT AT ALL
SUM OF ALL RESPONSES TO PHQ9 QUESTIONS 1 & 2: 0

## 2024-08-30 ASSESSMENT — ENCOUNTER SYMPTOMS
LOSS OF SENSATION IN FEET: 0
DEPRESSION: 0
OCCASIONAL FEELINGS OF UNSTEADINESS: 0

## 2024-08-30 ASSESSMENT — LIFESTYLE VARIABLES
SKIP TO QUESTIONS 9-10: 1
HOW MANY STANDARD DRINKS CONTAINING ALCOHOL DO YOU HAVE ON A TYPICAL DAY: PATIENT DOES NOT DRINK
HOW OFTEN DO YOU HAVE A DRINK CONTAINING ALCOHOL: NEVER
HOW OFTEN DO YOU HAVE SIX OR MORE DRINKS ON ONE OCCASION: NEVER
AUDIT-C TOTAL SCORE: 0

## 2024-08-30 ASSESSMENT — PAIN SCALES - GENERAL: PAINLEVEL: 0-NO PAIN

## 2024-09-02 DIAGNOSIS — Z79.4 TYPE 2 DIABETES MELLITUS WITH HYPERGLYCEMIA, WITH LONG-TERM CURRENT USE OF INSULIN (MULTI): ICD-10-CM

## 2024-09-02 DIAGNOSIS — E11.65 TYPE 2 DIABETES MELLITUS WITH HYPERGLYCEMIA, WITH LONG-TERM CURRENT USE OF INSULIN (MULTI): ICD-10-CM

## 2024-09-03 ENCOUNTER — PHARMACY VISIT (OUTPATIENT)
Dept: PHARMACY | Facility: CLINIC | Age: 64
End: 2024-09-03
Payer: MEDICAID

## 2024-09-03 PROCEDURE — RXMED WILLOW AMBULATORY MEDICATION CHARGE

## 2024-09-03 RX ORDER — INSULIN LISPRO 100 [IU]/ML
INJECTION, SOLUTION INTRAVENOUS; SUBCUTANEOUS
Qty: 135 ML | Refills: 1 | Status: SHIPPED | OUTPATIENT
Start: 2024-09-03 | End: 2025-09-03

## 2024-09-03 RX ORDER — INSULIN GLARGINE 100 [IU]/ML
INJECTION, SOLUTION SUBCUTANEOUS
Qty: 45 ML | Refills: 1 | Status: SHIPPED | OUTPATIENT
Start: 2024-09-03 | End: 2025-09-03

## 2024-09-03 RX ORDER — ATORVASTATIN CALCIUM 40 MG/1
40 TABLET, FILM COATED ORAL DAILY
Qty: 90 TABLET | Refills: 1 | Status: SHIPPED | OUTPATIENT
Start: 2024-09-03 | End: 2025-09-03

## 2024-09-04 ENCOUNTER — PHARMACY VISIT (OUTPATIENT)
Dept: PHARMACY | Facility: CLINIC | Age: 64
End: 2024-09-04
Payer: COMMERCIAL

## 2024-09-09 ENCOUNTER — LAB (OUTPATIENT)
Dept: LAB | Facility: LAB | Age: 64
End: 2024-09-09
Payer: MEDICAID

## 2024-09-09 DIAGNOSIS — N18.4 CHRONIC KIDNEY DISEASE, STAGE 4 (SEVERE) (MULTI): ICD-10-CM

## 2024-09-09 LAB
ALBUMIN SERPL BCP-MCNC: 3.7 G/DL (ref 3.4–5)
ANION GAP SERPL CALC-SCNC: 17 MMOL/L (ref 10–20)
BUN SERPL-MCNC: 24 MG/DL (ref 6–23)
CALCIUM SERPL-MCNC: 8.1 MG/DL (ref 8.6–10.3)
CHLORIDE SERPL-SCNC: 99 MMOL/L (ref 98–107)
CO2 SERPL-SCNC: 27 MMOL/L (ref 21–32)
CREAT SERPL-MCNC: 2.07 MG/DL (ref 0.5–1.05)
EGFRCR SERPLBLD CKD-EPI 2021: 27 ML/MIN/1.73M*2
ERYTHROCYTE [DISTWIDTH] IN BLOOD BY AUTOMATED COUNT: 13.2 % (ref 11.5–14.5)
GLUCOSE SERPL-MCNC: 242 MG/DL (ref 74–99)
HCT VFR BLD AUTO: 34.2 % (ref 36–46)
HGB BLD-MCNC: 10.9 G/DL (ref 12–16)
MCH RBC QN AUTO: 29.7 PG (ref 26–34)
MCHC RBC AUTO-ENTMCNC: 31.9 G/DL (ref 32–36)
MCV RBC AUTO: 93 FL (ref 80–100)
NRBC BLD-RTO: 0 /100 WBCS (ref 0–0)
PHOSPHATE SERPL-MCNC: 3.6 MG/DL (ref 2.5–4.9)
PLATELET # BLD AUTO: 253 X10*3/UL (ref 150–450)
POTASSIUM SERPL-SCNC: 4.3 MMOL/L (ref 3.5–5.3)
RBC # BLD AUTO: 3.67 X10*6/UL (ref 4–5.2)
SODIUM SERPL-SCNC: 139 MMOL/L (ref 136–145)
WBC # BLD AUTO: 7.6 X10*3/UL (ref 4.4–11.3)

## 2024-09-09 PROCEDURE — 85027 COMPLETE CBC AUTOMATED: CPT

## 2024-09-09 PROCEDURE — 83970 ASSAY OF PARATHORMONE: CPT

## 2024-09-09 PROCEDURE — 80069 RENAL FUNCTION PANEL: CPT

## 2024-09-10 LAB — PTH-INTACT SERPL-MCNC: 115.5 PG/ML (ref 18.5–88)

## 2024-10-08 PROCEDURE — RXMED WILLOW AMBULATORY MEDICATION CHARGE

## 2024-10-09 ENCOUNTER — PHARMACY VISIT (OUTPATIENT)
Dept: PHARMACY | Facility: CLINIC | Age: 64
End: 2024-10-09
Payer: COMMERCIAL

## 2024-10-09 PROCEDURE — RXMED WILLOW AMBULATORY MEDICATION CHARGE

## 2024-10-28 PROCEDURE — RXMED WILLOW AMBULATORY MEDICATION CHARGE

## 2024-10-29 PROCEDURE — RXMED WILLOW AMBULATORY MEDICATION CHARGE

## 2024-10-30 ENCOUNTER — PHARMACY VISIT (OUTPATIENT)
Dept: PHARMACY | Facility: CLINIC | Age: 64
End: 2024-10-30
Payer: COMMERCIAL

## 2024-11-14 PROCEDURE — RXMED WILLOW AMBULATORY MEDICATION CHARGE

## 2024-11-18 ENCOUNTER — PHARMACY VISIT (OUTPATIENT)
Dept: PHARMACY | Facility: CLINIC | Age: 64
End: 2024-11-18
Payer: COMMERCIAL

## 2024-11-21 PROCEDURE — RXMED WILLOW AMBULATORY MEDICATION CHARGE

## 2024-11-22 ENCOUNTER — PHARMACY VISIT (OUTPATIENT)
Dept: PHARMACY | Facility: CLINIC | Age: 64
End: 2024-11-22
Payer: COMMERCIAL

## 2024-11-25 ENCOUNTER — PHARMACY VISIT (OUTPATIENT)
Dept: PHARMACY | Facility: CLINIC | Age: 64
End: 2024-11-25
Payer: MEDICAID

## 2024-11-25 PROCEDURE — RXMED WILLOW AMBULATORY MEDICATION CHARGE

## 2024-11-26 PROCEDURE — RXMED WILLOW AMBULATORY MEDICATION CHARGE

## 2024-12-02 PROCEDURE — RXMED WILLOW AMBULATORY MEDICATION CHARGE

## 2024-12-03 ENCOUNTER — APPOINTMENT (OUTPATIENT)
Dept: ENDOCRINOLOGY | Facility: CLINIC | Age: 64
End: 2024-12-03
Payer: COMMERCIAL

## 2024-12-03 ENCOUNTER — PHARMACY VISIT (OUTPATIENT)
Dept: PHARMACY | Facility: CLINIC | Age: 64
End: 2024-12-03
Payer: COMMERCIAL

## 2024-12-04 ENCOUNTER — PHARMACY VISIT (OUTPATIENT)
Dept: PHARMACY | Facility: CLINIC | Age: 64
End: 2024-12-04
Payer: COMMERCIAL

## 2024-12-27 ENCOUNTER — PHARMACY VISIT (OUTPATIENT)
Dept: PHARMACY | Facility: CLINIC | Age: 64
End: 2024-12-27
Payer: MEDICAID

## 2024-12-27 PROCEDURE — RXMED WILLOW AMBULATORY MEDICATION CHARGE

## 2024-12-27 RX ORDER — CLONIDINE HYDROCHLORIDE 0.2 MG/1
0.2 TABLET ORAL 3 TIMES DAILY
Qty: 270 TABLET | Refills: 1 | OUTPATIENT
Start: 2024-12-27

## 2024-12-27 RX ORDER — ALLOPURINOL 100 MG/1
100 TABLET ORAL DAILY
Qty: 90 TABLET | Refills: 3 | OUTPATIENT
Start: 2024-12-27

## 2024-12-28 ENCOUNTER — PHARMACY VISIT (OUTPATIENT)
Dept: PHARMACY | Facility: CLINIC | Age: 64
End: 2024-12-28
Payer: COMMERCIAL

## 2025-01-06 PROCEDURE — RXMED WILLOW AMBULATORY MEDICATION CHARGE

## 2025-01-07 ENCOUNTER — PHARMACY VISIT (OUTPATIENT)
Dept: PHARMACY | Facility: CLINIC | Age: 65
End: 2025-01-07
Payer: COMMERCIAL

## 2025-01-27 DIAGNOSIS — E11.65 TYPE 2 DIABETES MELLITUS WITH HYPERGLYCEMIA, WITH LONG-TERM CURRENT USE OF INSULIN: ICD-10-CM

## 2025-01-27 DIAGNOSIS — Z79.4 TYPE 2 DIABETES MELLITUS WITH HYPERGLYCEMIA, WITH LONG-TERM CURRENT USE OF INSULIN: ICD-10-CM

## 2025-01-27 PROCEDURE — RXMED WILLOW AMBULATORY MEDICATION CHARGE

## 2025-01-27 RX ORDER — PIOGLITAZONEHYDROCHLORIDE 15 MG/1
TABLET ORAL DAILY
Qty: 45 TABLET | Refills: 1 | Status: SHIPPED | OUTPATIENT
Start: 2025-01-27 | End: 2026-01-27

## 2025-01-28 ENCOUNTER — PHARMACY VISIT (OUTPATIENT)
Dept: PHARMACY | Facility: CLINIC | Age: 65
End: 2025-01-28
Payer: COMMERCIAL

## 2025-02-08 DIAGNOSIS — Z79.4 TYPE 2 DIABETES MELLITUS WITH HYPERGLYCEMIA, WITH LONG-TERM CURRENT USE OF INSULIN: ICD-10-CM

## 2025-02-08 DIAGNOSIS — E11.65 TYPE 2 DIABETES MELLITUS WITH HYPERGLYCEMIA, WITH LONG-TERM CURRENT USE OF INSULIN: ICD-10-CM

## 2025-02-10 PROCEDURE — RXMED WILLOW AMBULATORY MEDICATION CHARGE

## 2025-02-10 RX ORDER — SEMAGLUTIDE 2.68 MG/ML
2 INJECTION, SOLUTION SUBCUTANEOUS
Qty: 9 ML | Refills: 1 | Status: SHIPPED | OUTPATIENT
Start: 2025-02-16

## 2025-02-12 ENCOUNTER — PHARMACY VISIT (OUTPATIENT)
Dept: PHARMACY | Facility: CLINIC | Age: 65
End: 2025-02-12
Payer: COMMERCIAL

## 2025-02-12 ENCOUNTER — APPOINTMENT (OUTPATIENT)
Dept: ENDOCRINOLOGY | Facility: CLINIC | Age: 65
End: 2025-02-12
Payer: COMMERCIAL

## 2025-02-12 PROCEDURE — RXMED WILLOW AMBULATORY MEDICATION CHARGE

## 2025-02-21 ENCOUNTER — TELEPHONE (OUTPATIENT)
Dept: ENDOCRINOLOGY | Facility: CLINIC | Age: 65
End: 2025-02-21
Payer: COMMERCIAL

## 2025-02-21 NOTE — TELEPHONE ENCOUNTER
Called Kathy back to get her 02/25/2025 appt rescheduled as she asked; however, I reached her voicemail. Left message asking her to call us back.

## 2025-02-24 DIAGNOSIS — E11.65 TYPE 2 DIABETES MELLITUS WITH HYPERGLYCEMIA, WITH LONG-TERM CURRENT USE OF INSULIN: ICD-10-CM

## 2025-02-24 DIAGNOSIS — Z79.4 TYPE 2 DIABETES MELLITUS WITH HYPERGLYCEMIA, WITH LONG-TERM CURRENT USE OF INSULIN: ICD-10-CM

## 2025-02-24 PROCEDURE — RXMED WILLOW AMBULATORY MEDICATION CHARGE

## 2025-02-24 RX ORDER — INSULIN GLARGINE 100 [IU]/ML
INJECTION, SOLUTION SUBCUTANEOUS
Qty: 45 ML | Refills: 1 | Status: SHIPPED | OUTPATIENT
Start: 2025-02-24 | End: 2026-02-24

## 2025-02-24 RX ORDER — ATORVASTATIN CALCIUM 40 MG/1
40 TABLET, FILM COATED ORAL DAILY
Qty: 90 TABLET | Refills: 1 | Status: SHIPPED | OUTPATIENT
Start: 2025-02-24 | End: 2026-02-24

## 2025-02-25 ENCOUNTER — PHARMACY VISIT (OUTPATIENT)
Dept: PHARMACY | Facility: CLINIC | Age: 65
End: 2025-02-25
Payer: COMMERCIAL

## 2025-02-25 ENCOUNTER — APPOINTMENT (OUTPATIENT)
Dept: ENDOCRINOLOGY | Facility: CLINIC | Age: 65
End: 2025-02-25
Payer: COMMERCIAL

## 2025-02-25 PROCEDURE — RXMED WILLOW AMBULATORY MEDICATION CHARGE

## 2025-02-25 RX ORDER — FUROSEMIDE 40 MG/1
40 TABLET ORAL DAILY
Qty: 90 TABLET | Refills: 3 | OUTPATIENT
Start: 2025-02-25

## 2025-02-26 ENCOUNTER — PHARMACY VISIT (OUTPATIENT)
Dept: PHARMACY | Facility: CLINIC | Age: 65
End: 2025-02-26
Payer: COMMERCIAL

## 2025-03-04 ENCOUNTER — PHARMACY VISIT (OUTPATIENT)
Dept: PHARMACY | Facility: CLINIC | Age: 65
End: 2025-03-04
Payer: COMMERCIAL

## 2025-03-04 PROCEDURE — RXMED WILLOW AMBULATORY MEDICATION CHARGE

## 2025-03-21 ENCOUNTER — PHARMACY VISIT (OUTPATIENT)
Dept: PHARMACY | Facility: CLINIC | Age: 65
End: 2025-03-21
Payer: COMMERCIAL

## 2025-03-21 PROCEDURE — RXMED WILLOW AMBULATORY MEDICATION CHARGE

## 2025-04-07 ENCOUNTER — TELEPHONE (OUTPATIENT)
Dept: OBSTETRICS AND GYNECOLOGY | Facility: CLINIC | Age: 65
End: 2025-04-07
Payer: COMMERCIAL

## 2025-04-07 DIAGNOSIS — N95.0 POSTMENOPAUSAL BLEEDING: Primary | ICD-10-CM

## 2025-04-07 NOTE — TELEPHONE ENCOUNTER
LEIGHA Greene pt calling in with complaints of PMB. Heavy X 2 days and at night. Pt Had thickened lining in 2022. EMB done 01/2022 and showed some atypia. Pt was referred to GYN/ONCO for a D&C due to medical conditions and BMI, pt never went to see them as there was no one close to her.

## 2025-04-09 DIAGNOSIS — N95.0 POSTMENOPAUSAL BLEEDING: Primary | ICD-10-CM

## 2025-04-23 PROCEDURE — RXMED WILLOW AMBULATORY MEDICATION CHARGE

## 2025-04-25 ENCOUNTER — HOSPITAL ENCOUNTER (OUTPATIENT)
Dept: RADIOLOGY | Facility: HOSPITAL | Age: 65
Discharge: HOME | End: 2025-04-25
Payer: COMMERCIAL

## 2025-04-25 DIAGNOSIS — N95.0 POSTMENOPAUSAL BLEEDING: ICD-10-CM

## 2025-04-25 PROCEDURE — 76830 TRANSVAGINAL US NON-OB: CPT

## 2025-04-28 ENCOUNTER — PHARMACY VISIT (OUTPATIENT)
Dept: PHARMACY | Facility: CLINIC | Age: 65
End: 2025-04-28
Payer: COMMERCIAL

## 2025-04-28 PROCEDURE — RXMED WILLOW AMBULATORY MEDICATION CHARGE

## 2025-04-30 NOTE — PROGRESS NOTES
Patient ID: Kathy Lara is a 64 y.o. female.  Primary Care Provider: Vinod Matos,     Subjective    HPI:  Had episode of PMB in , went to OB/GYN and had EMB which was negative, see below.  No further bleeding until 2025 for 2 days.    Denies any bowel or bladder issues.  Not been sexually active.      Medical History:  HTN  Hemorrhagic stroke 2007  Hyperlipidemia  Diabetes- on Ozempic    Surgical History:   Appendectomy- RSO due to adhesions  Cataract surgery    Social History:   Denies tobacco  Denies ETOH  Denies drugs  Lives alone, single story house  Doesn't drive, has  that transports to medical appointments    Obstetrics/Gynecology History:   Menopause     Family History: None she is aware of    EMB 2022:  ENDOMETRIUM, BIOPSY:    DISORDERED PROLIFERATIVE ENDOMETRIUM WITH GLAND CROWDING,    EOSINOPHILIC CELL CHANGE AND FOCAL ATYPIA, SEE COMMENT.       Pelvic ultrasound 25  IMPRESSION:  1. Abnormally thickened and heterogenous endometrial stripe measuring  up to 2.4 cm. Advise tissue sampling as diagnostic consideration  including neoplasm, hyperplasia or polyps. To note, there is  suggestion of upper endometrial heterogeneity measuring 1.6 cm  unclear if related to the thickened endometrial stripe or  alternatively representing submucosal fibroid. Attention on  forthcoming D&C      Objective    Visit Vitals  BP (!) 187/107 (BP Location: Right arm, Patient Position: Sitting, BP Cuff Size: Adult)   Pulse 79   Resp 18          Physical Exam:    Constitutional: Doing well.   Eyes: PERRL  ENMT: Moist mucus membranes  Head/Neck: Supple. Symmetrical  Cardiovascular: Regular, rate and rhythm. 2+ equal pulses of the extremities  Respiratory/Thorax: CTA. RRR. Chest rise symmetrical.  Gastrointestinal: Non-distended, soft, non-tender  Genitourinary:   Normal external female genitalia. No vulvar lesions noted  Speculum exam: Smooth vaginal walls without lesions or masses.  No blood noted in vaginal vault upon exam.  Cervical os visualized with no lesions noted.      EMB: Cervix was prepped with betadine.  Pipelle inserted, sounded to 4 cm.  Blood noted upon insertion of pipelle.  Tissue obtained.  Cotton swabs used to dry vaginal vault, upon patient moving up in bed, bleeding re-started.  Vaginal packing in vagina with multiple 4x4 used to obtain hemostasis. Patient laid horizontal for 30 minutes.  Vaginal packing removed without difficulty, no further bleeding but spontaneous void of urine noted.       Musculoskeletal: ROM intact, no joint swelling, normal strength  Extremities: No edema  Neurological: Alert and oriented x 3. Pleasant and cooperative.  Lymphatic: No lymphadenopathy. No lymphedema  Psychological: Appropriate mood and behavior  Skin: Warm and dry, no lesions, no rashes    A complete review of systems was performed and all systems were normal except what is noted in the interval history.      Assessment/Plan   Patient is a 64 year old female with post menopausal bleeding.      PLAN:  EMB today, F/U results.  If sample all blood or non-diagnostic will be referred for D&C  If carcinoma noted on biopsy will do CT A/P and PAT and schedule for surgical management with Dr. Nur  Call patient with plan after results

## 2025-05-01 ENCOUNTER — APPOINTMENT (OUTPATIENT)
Dept: GYNECOLOGIC ONCOLOGY | Facility: CLINIC | Age: 65
End: 2025-05-01
Payer: COMMERCIAL

## 2025-05-01 VITALS
RESPIRATION RATE: 18 BRPM | WEIGHT: 234.2 LBS | DIASTOLIC BLOOD PRESSURE: 107 MMHG | OXYGEN SATURATION: 96 % | SYSTOLIC BLOOD PRESSURE: 187 MMHG | HEART RATE: 79 BPM | HEIGHT: 60 IN | BODY MASS INDEX: 45.98 KG/M2

## 2025-05-01 DIAGNOSIS — N95.0 POSTMENOPAUSAL BLEEDING: ICD-10-CM

## 2025-05-01 PROCEDURE — 3077F SYST BP >= 140 MM HG: CPT | Performed by: NURSE PRACTITIONER

## 2025-05-01 PROCEDURE — 88305 TISSUE EXAM BY PATHOLOGIST: CPT

## 2025-05-01 PROCEDURE — 99205 OFFICE O/P NEW HI 60 MIN: CPT | Performed by: NURSE PRACTITIONER

## 2025-05-01 PROCEDURE — 88341 IMHCHEM/IMCYTCHM EA ADD ANTB: CPT | Performed by: PATHOLOGY

## 2025-05-01 PROCEDURE — 4010F ACE/ARB THERAPY RXD/TAKEN: CPT | Performed by: NURSE PRACTITIONER

## 2025-05-01 PROCEDURE — 88342 IMHCHEM/IMCYTCHM 1ST ANTB: CPT

## 2025-05-01 PROCEDURE — 88341 IMHCHEM/IMCYTCHM EA ADD ANTB: CPT

## 2025-05-01 PROCEDURE — 58100 BIOPSY OF UTERUS LINING: CPT | Performed by: NURSE PRACTITIONER

## 2025-05-01 PROCEDURE — 3008F BODY MASS INDEX DOCD: CPT | Performed by: NURSE PRACTITIONER

## 2025-05-01 PROCEDURE — 88342 IMHCHEM/IMCYTCHM 1ST ANTB: CPT | Performed by: PATHOLOGY

## 2025-05-01 PROCEDURE — 3080F DIAST BP >= 90 MM HG: CPT | Performed by: NURSE PRACTITIONER

## 2025-05-01 PROCEDURE — 88305 TISSUE EXAM BY PATHOLOGIST: CPT | Performed by: PATHOLOGY

## 2025-05-01 ASSESSMENT — PAIN SCALES - GENERAL: PAINLEVEL_OUTOF10: 0-NO PAIN

## 2025-05-02 PROCEDURE — RXMED WILLOW AMBULATORY MEDICATION CHARGE

## 2025-05-06 ENCOUNTER — PHARMACY VISIT (OUTPATIENT)
Dept: PHARMACY | Facility: CLINIC | Age: 65
End: 2025-05-06
Payer: COMMERCIAL

## 2025-05-09 DIAGNOSIS — C54.1 ENDOMETRIAL CANCER (MULTI): Primary | ICD-10-CM

## 2025-05-09 LAB
LAB AP ASR DISCLAIMER: NORMAL
LABORATORY COMMENT REPORT: NORMAL
PATH REPORT.FINAL DX SPEC: NORMAL
PATH REPORT.GROSS SPEC: NORMAL
PATH REPORT.RELEVANT HX SPEC: NORMAL
PATH REPORT.TOTAL CANCER: NORMAL

## 2025-05-09 NOTE — PROGRESS NOTES
Spoke to patient about endometrial biopsy results.   States she did not have any further bleeding after biopsy.  CT A/P will be ordered and then she will see Dr. Nur for surgical management.          Endometrial biopsy:  -- Endometrial adenocarcinoma, endometrioid type, FIGO grade 1; see note.     Note: Immunohistochemical staining for mismatch repair proteins and p53 will be performed and reported in addenda.

## 2025-05-12 LAB
LAB AP ASR DISCLAIMER: NORMAL
LABORATORY COMMENT REPORT: NORMAL
PATH REPORT.ADDENDUM SPEC: NORMAL
PATH REPORT.ADDENDUM SPEC: NORMAL
PATH REPORT.FINAL DX SPEC: NORMAL
PATH REPORT.GROSS SPEC: NORMAL
PATH REPORT.RELEVANT HX SPEC: NORMAL
PATH REPORT.TOTAL CANCER: NORMAL

## 2025-05-23 ENCOUNTER — HOSPITAL ENCOUNTER (OUTPATIENT)
Dept: RADIOLOGY | Facility: CLINIC | Age: 65
Discharge: HOME | End: 2025-05-23
Payer: COMMERCIAL

## 2025-05-23 DIAGNOSIS — C54.1 ENDOMETRIAL CANCER (MULTI): ICD-10-CM

## 2025-05-23 PROCEDURE — 74176 CT ABD & PELVIS W/O CONTRAST: CPT

## 2025-05-26 PROCEDURE — RXMED WILLOW AMBULATORY MEDICATION CHARGE

## 2025-05-27 PROCEDURE — RXMED WILLOW AMBULATORY MEDICATION CHARGE

## 2025-05-29 ENCOUNTER — PHARMACY VISIT (OUTPATIENT)
Dept: PHARMACY | Facility: CLINIC | Age: 65
End: 2025-05-29
Payer: COMMERCIAL

## 2025-05-30 ENCOUNTER — TELEPHONE (OUTPATIENT)
Dept: HEMATOLOGY/ONCOLOGY | Facility: CLINIC | Age: 65
End: 2025-05-30
Payer: COMMERCIAL

## 2025-05-30 ENCOUNTER — TELEPHONE (OUTPATIENT)
Dept: GYNECOLOGIC ONCOLOGY | Facility: HOSPITAL | Age: 65
End: 2025-05-30
Payer: COMMERCIAL

## 2025-05-30 NOTE — TELEPHONE ENCOUNTER
Reason for Conversation  type of appointments?    Background   Patient called and would like to know if when she sees Dr. Nur on 06/05/2025 @ 10:20a.m. if this is just a consultation to speak with her or will a vaginal exam be preformed. The patient was also questioning why she needs to to see 2 GYN Onc Providers since she is already established with Shanae Brito CNP. Please advise. Thank you.     Disposition   No disposition on file.

## 2025-05-30 NOTE — TELEPHONE ENCOUNTER
Phoned patient in follow up to message received regarding patient questions for 6/5 office visit.       Patient asking if appointment with Dr. Nur will be a consult only or will she perform an exam.   Notified patient that Dr. Nur may perform an exam due to recent diagnosis of endometrial cancer.     Patient also asked why she needs to see an NP Shanae Brito and Dr. Nur.   Notified patient that she needs to see a physician in order to perform surgery and treatment plan.       Patient verbalized her understanding of information given.

## 2025-06-02 ENCOUNTER — APPOINTMENT (OUTPATIENT)
Dept: OBSTETRICS AND GYNECOLOGY | Facility: CLINIC | Age: 65
End: 2025-06-02
Payer: COMMERCIAL

## 2025-06-05 ENCOUNTER — OFFICE VISIT (OUTPATIENT)
Dept: GYNECOLOGIC ONCOLOGY | Facility: CLINIC | Age: 65
End: 2025-06-05
Payer: COMMERCIAL

## 2025-06-05 DIAGNOSIS — C54.1 ENDOMETRIAL CANCER (MULTI): Primary | ICD-10-CM

## 2025-06-05 RX ORDER — CELECOXIB 400 MG/1
400 CAPSULE ORAL ONCE
OUTPATIENT
Start: 2025-06-05 | End: 2025-06-05

## 2025-06-05 RX ORDER — HEPARIN SODIUM 5000 [USP'U]/ML
5000 INJECTION, SOLUTION INTRAVENOUS; SUBCUTANEOUS ONCE
OUTPATIENT
Start: 2025-06-05 | End: 2025-06-05

## 2025-06-05 RX ORDER — GABAPENTIN 600 MG/1
600 TABLET ORAL ONCE
OUTPATIENT
Start: 2025-06-05 | End: 2025-06-05

## 2025-06-05 RX ORDER — ACETAMINOPHEN 325 MG/1
975 TABLET ORAL ONCE
OUTPATIENT
Start: 2025-06-05 | End: 2025-06-05

## 2025-06-05 NOTE — PROGRESS NOTES
"  Gynecologic Oncology Initial Consultation    [facility]    Patient ID: Kathy Lara is a 64 y.o. female.  Referring Physician: No referring provider defined for this encounter.  Primary Care Provider: Vinod Matos DO     Reason for Consultation: Endometrial cancer    Subjective    HPI    Review of Systems - Oncology     Objective   BSA: There is no height or weight on file to calculate BSA.  There were no vitals taken for this visit.     Medical History[1]  - Hx hemorrhagic stroke 2007  - HTN  - HLD  - T2DM on GLP1a, Lantus/SSI - last A1c 8.8 5/2025  - CKD (baseline Cr 2.1)    Surgical History[2]  - Appendectomy  - RSO; reports prior adhesions     Family History[3]  - Denies family history of breast, colon, ovarian, uterine or prostate cancer     OB/GYN History: G0  - Menopausal status: menopausal since 2014, prior PMB with negative sampling in 2022   - Last Pap: 10/2021 NILM, other HRHPV positive    Health Maintenance:   - Last mammogram:  - Colonoscopy:   - Last DXA:     Kathy Lara  reports that she has never smoked. She has never used smokeless tobacco.  She  reports that she does not currently use alcohol.  She Drug use questions deferred to the physician.    Physical Exam  General:   {gen appearance:42252::\"alert and oriented, in no acute distress\"}   Heart: {heart exam:5510::\"regular rate and rhythm, S1, S2 normal, no murmur, click, rub or gallop\"}   Lungs: {lung exam:67357::\"clear to auscultation bilaterally\"}   Abdomen: {abd exam:45940::\"soft, non-tender, without masses or organomegaly\"}   Vulva: {vulva exam:38486}   Vagina: {vaginal exam:69935}   Cervix: {cervix exam:21283}   Uterus: {uterus exam:86514}   Adnexa: {adnexa:80400}   Rectal: {rectal exam:73131::\"normal tone, no masses or tenderness\"}   Lymph Nodes:  {lymph node exam:16531::\"Cervical, supraclavicular, and axillary nodes normal.\"}   Extremities: {exam extremities:99329::\"warm, well-perfused without cyanosis, clubbing or edema\"} " "  Skin: {HEM/ONC EXAM SKIN:11446::\"Normal\"}   Lines/Access:      Pathology:   5/1/25 - Endometrial biopsy:  -- Endometrial adenocarcinoma, endometrioid type, FIGO grade 1; see note.  Note: Immunohistochemical staining for mismatch repair proteins and p53 will be performed and reported in addenda.   INTERPRETATION: Endometrial carcinoma with normal mismatch repair protein expression.  INTERPRETATION:  normal (wild type)  (PATTERN: patchy nuclear staining  )                    Recent Imaging:   CT abdomen pelvis wo IV contrast  Result Date: 5/25/2025  Impression:   1. Cholelithiasis. Further evaluation with ultrasound is suggested.   2. Suspect small right renal cyst but incompletely characterized in this exam. Further evaluation with ultrasound should be considered. No hydronephrosis or hydroureter bilaterally.   3. Colonic diverticulosis but no CT evidence for acute diverticulitis.   4. Additional detailed nonacute findings as above.       Signed by: Zeinab Cartwright 5/25/2025 7:56 PM Dictation workstation:   [id number]    Performance Status:  {ECOG performance status:18715}    Assessment/Plan      Oncology History    No history exists.          {Assess/PlanSmartLinks:73484}    Treatment Plans       No treatment plans exist                                    [1]   Past Medical History:  Diagnosis Date    Essential (primary) hypertension     Mixed hyperlipidemia     Personal history of other diseases of the circulatory system 06/10/2014    History of hypertension    Personal history of other endocrine, nutritional and metabolic disease 06/10/2014    History of diabetes mellitus    Type 2 diabetes mellitus with hyperglycemia, with long-term current use of insulin (Multi)    [2]   Past Surgical History:  Procedure Laterality Date    APPENDECTOMY  06/10/2014    Appendectomy    EYE SURGERY  06/10/2014    Eye Surgery    SHOULDER SURGERY  06/10/2014    Shoulder Surgery   [3]   Family History  Problem Relation Name Age of " Onset    Diabetes Mother      Heart disease Father      Diabetes Father      Stroke Father      Diabetes Sister      Drug abuse Brother

## 2025-06-09 PROBLEM — C54.1 ENDOMETRIAL CANCER (MULTI): Status: ACTIVE | Noted: 2025-06-05

## 2025-06-10 DIAGNOSIS — Z79.4 TYPE 2 DIABETES MELLITUS WITH HYPERGLYCEMIA, WITH LONG-TERM CURRENT USE OF INSULIN: ICD-10-CM

## 2025-06-10 DIAGNOSIS — E11.65 TYPE 2 DIABETES MELLITUS WITH HYPERGLYCEMIA, WITH LONG-TERM CURRENT USE OF INSULIN: ICD-10-CM

## 2025-06-10 PROCEDURE — RXMED WILLOW AMBULATORY MEDICATION CHARGE

## 2025-06-10 RX ORDER — INSULIN GLARGINE 100 [IU]/ML
INJECTION, SOLUTION SUBCUTANEOUS
Qty: 45 ML | Refills: 1 | Status: SHIPPED | OUTPATIENT
Start: 2025-06-10 | End: 2026-06-10

## 2025-06-12 ENCOUNTER — PHARMACY VISIT (OUTPATIENT)
Dept: PHARMACY | Facility: CLINIC | Age: 65
End: 2025-06-12
Payer: COMMERCIAL

## 2025-06-12 ENCOUNTER — OFFICE VISIT (OUTPATIENT)
Dept: GYNECOLOGIC ONCOLOGY | Facility: CLINIC | Age: 65
End: 2025-06-12
Payer: COMMERCIAL

## 2025-06-12 VITALS
SYSTOLIC BLOOD PRESSURE: 124 MMHG | TEMPERATURE: 97 F | DIASTOLIC BLOOD PRESSURE: 75 MMHG | WEIGHT: 226.41 LBS | OXYGEN SATURATION: 100 % | HEART RATE: 76 BPM | HEIGHT: 61 IN | BODY MASS INDEX: 42.75 KG/M2

## 2025-06-12 DIAGNOSIS — C54.1 ENDOMETRIAL CANCER DETERMINED BY UTERINE BIOPSY (MULTI): Primary | ICD-10-CM

## 2025-06-12 PROCEDURE — 1036F TOBACCO NON-USER: CPT | Performed by: STUDENT IN AN ORGANIZED HEALTH CARE EDUCATION/TRAINING PROGRAM

## 2025-06-12 PROCEDURE — 3078F DIAST BP <80 MM HG: CPT | Performed by: STUDENT IN AN ORGANIZED HEALTH CARE EDUCATION/TRAINING PROGRAM

## 2025-06-12 PROCEDURE — 99215 OFFICE O/P EST HI 40 MIN: CPT | Performed by: STUDENT IN AN ORGANIZED HEALTH CARE EDUCATION/TRAINING PROGRAM

## 2025-06-12 PROCEDURE — 4010F ACE/ARB THERAPY RXD/TAKEN: CPT | Performed by: STUDENT IN AN ORGANIZED HEALTH CARE EDUCATION/TRAINING PROGRAM

## 2025-06-12 PROCEDURE — 3074F SYST BP LT 130 MM HG: CPT | Performed by: STUDENT IN AN ORGANIZED HEALTH CARE EDUCATION/TRAINING PROGRAM

## 2025-06-12 PROCEDURE — RXMED WILLOW AMBULATORY MEDICATION CHARGE

## 2025-06-12 PROCEDURE — 3008F BODY MASS INDEX DOCD: CPT | Performed by: STUDENT IN AN ORGANIZED HEALTH CARE EDUCATION/TRAINING PROGRAM

## 2025-06-12 ASSESSMENT — COLUMBIA-SUICIDE SEVERITY RATING SCALE - C-SSRS
2. HAVE YOU ACTUALLY HAD ANY THOUGHTS OF KILLING YOURSELF?: NO
6. HAVE YOU EVER DONE ANYTHING, STARTED TO DO ANYTHING, OR PREPARED TO DO ANYTHING TO END YOUR LIFE?: NO
1. IN THE PAST MONTH, HAVE YOU WISHED YOU WERE DEAD OR WISHED YOU COULD GO TO SLEEP AND NOT WAKE UP?: NO

## 2025-06-12 ASSESSMENT — ENCOUNTER SYMPTOMS
LOSS OF SENSATION IN FEET: 0
DEPRESSION: 0
OCCASIONAL FEELINGS OF UNSTEADINESS: 0

## 2025-06-12 ASSESSMENT — PATIENT HEALTH QUESTIONNAIRE - PHQ9
2. FEELING DOWN, DEPRESSED OR HOPELESS: SEVERAL DAYS
1. LITTLE INTEREST OR PLEASURE IN DOING THINGS: NOT AT ALL
SUM OF ALL RESPONSES TO PHQ9 QUESTIONS 1 AND 2: 1

## 2025-06-12 ASSESSMENT — PAIN SCALES - GENERAL: PAINLEVEL_OUTOF10: 0-NO PAIN

## 2025-06-12 NOTE — PROGRESS NOTES
"  Gynecologic Oncology Initial Consultation    [facility]    Patient ID: Kathy Lara is a 64 y.o. female.  Referring Physician: No referring provider defined for this encounter.  Primary Care Provider: Vinod Matos DO     Reason for Consultation: Endometrial cancer    Subjective    63yo G0 presenting in surgical consultation for newly diagnosed endometrial cancer. 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 Reports intermittent spotting with pink vaginal discharge. Denies abdominopelvic pain, nausea/vomiting, fevers, chills, chest pain or shortness of breath. Voiding and having regular bowel movements without difficulty. Intermittent stress urinary incontinence at baseline unchanged. Denies recent weight change but recently started on GLP1 agonist. No recent falls. No other concerns/complaints. Denies interim hospitalizations since last assessment.     A comprehensive review of systems was performed and otherwise negative.       Objective   BSA: 2.1 meters squared  /75 (BP Location: Left arm, Patient Position: Sitting, BP Cuff Size: Large adult long)   Pulse 76   Temp 36.1 °C (97 °F) (Temporal)   Ht 1.539 m (5' 0.59\")   Wt 103 kg (226 lb 6.6 oz)   SpO2 100%   BMI 43.36 kg/m²      Medical History[1]  - Hx hemorrhagic stroke 2007  - HTN  - HLD  - T2DM on GLP1a, Lantus/SSI - last A1c 8.8 5/2025  - CKD (baseline Cr 2.1)    Surgical History[2]  - Appendectomy  - RSO; reports prior adhesions     Family History[3]  - Denies family history of breast, colon, ovarian, uterine or prostate cancer     OB/GYN History: G0  - Menopausal status: menopausal since 2014, prior PMB with negative sampling in 2022   - Last Pap: 10/2021 NILM, other HRHPV positive    Health Maintenance:   - Last mammogram:  - Colonoscopy:   - Last DXA:     Kathy Lara  reports that she has never smoked. " She has never used smokeless tobacco.  She  reports that she does not currently use alcohol.  She Drug use questions deferred to the physician.    Physical Exam  Vitals and nursing note reviewed. Exam conducted with a chaperone present.   Constitutional:       Appearance: Normal appearance.   HENT:      Head: Normocephalic and atraumatic.      Mouth/Throat:      Mouth: Mucous membranes are dry.      Pharynx: Oropharynx is clear.   Eyes:      Conjunctiva/sclera: Conjunctivae normal.      Pupils: Pupils are equal, round, and reactive to light.   Cardiovascular:      Rate and Rhythm: Normal rate and regular rhythm.      Pulses: Normal pulses.      Heart sounds: Normal heart sounds.   Pulmonary:      Effort: Pulmonary effort is normal.      Breath sounds: Normal breath sounds.   Abdominal:      General: Abdomen is flat. Bowel sounds are normal.      Palpations: Abdomen is soft.      Hernia: There is no hernia in the left inguinal area or right inguinal area.   Genitourinary:     General: Normal vulva.      Exam position: Lithotomy position.      Pubic Area: No rash.       Labia:         Right: No rash, tenderness or lesion.         Left: No rash, tenderness or lesion.       Vagina: Normal. No lesions.      Cervix: Normal. No lesion.      Uterus: Normal.       Adnexa: Right adnexa normal and left adnexa normal.        Right: No mass, tenderness or fullness.          Left: No mass, tenderness or fullness.     Musculoskeletal:         General: Normal range of motion.      Cervical back: Normal range of motion and neck supple.   Lymphadenopathy:      Lower Body: No right inguinal adenopathy. No left inguinal adenopathy.   Skin:     General: Skin is warm.   Neurological:      General: No focal deficit present.      Mental Status: She is alert and oriented to person, place, and time.   Psychiatric:         Mood and Affect: Mood normal.         Thought Content: Thought content normal.       Pathology:   5/1/25 - Endometrial  biopsy:  -- Endometrial adenocarcinoma, endometrioid type, FIGO grade 1; see note.  Note: Immunohistochemical staining for mismatch repair proteins and p53 will be performed and reported in addenda.   INTERPRETATION: Endometrial carcinoma with normal mismatch repair protein expression.  INTERPRETATION:  normal (wild type)  (PATTERN: patchy nuclear staining  )                    Recent Imaging:   CT abdomen pelvis wo IV contrast  Result Date: 5/25/2025  Impression:   1. Cholelithiasis. Further evaluation with ultrasound is suggested.   2. Suspect small right renal cyst but incompletely characterized in this exam. Further evaluation with ultrasound should be considered. No hydronephrosis or hydroureter bilaterally.   3. Colonic diverticulosis but no CT evidence for acute diverticulitis.   4. Additional detailed nonacute findings as above.       Signed by: Zeinab Cartwright 5/25/2025 7:56 PM Dictation workstation:   [id number]    Performance Status:  Symptomatic; fully ambulatory    Assessment/Plan    63yo with newly diagnosed FIGO grade 1 endometrial cancer for treatment planning. Risk factors: hx hemorrhagic stroke, T2DM on insulin, obesity and nulliparity. ECOG 1.   Oncology History    No history exists.        Diagnoses and all orders for this visit:  Endometrial cancer determined by uterine biopsy (Multi)  - The natural history of endometrial carcinoma was discussed in setting of her pathology.  We discussed the significance of tumor grade and stage, and that a majority of women are at early stage at time of diagnosis as determined by surgical pathology. Standard of care treatment options and alternatives were reviewed, including the recommendation for surgical management with hysterectomy, bilateral salpingo-oophorectomy, and sentinel lymph node evaluation via minimally invasive approach. We discussed the benefits of minimally invasive surgery over open surgical techniques including shorter postoperative recovery,  shorter duration of hospitalization and return to baseline activity levels. Patient was counseled regarding risk of conversion to laparotomy and possible full lymphadenectomy in setting of unsuccessful lymph node mapping.  - CT without retroperitoneal lymphadenopathy; diverticular disease noted   - I have specifically discussed in detail with the patient the risks of surgery, including bleeding, infection, scarring, thromboembolic complications, lymphedema, incisional complications and injury to adjacent organs and structures, including the bladder, ureters, intestinal tract, major blood vessels, and nerves, need for additional procedures and possible death. Postoperative recovery was reviewed, including the risk of venous thrombosis. The patient was given time to ask pertinent questions and would like to proceed with the procedure(s) listed above. Other alternatives including nonsurgical options reviewed with patients. All questions were answered to patient's satisfaction.  - Reviewed postoperative expectations and instructions, including pelvic rest for 6 weeks, no heavy lifting for 4 weeks. Surgical consents reviewed and signed in office.   - PAT testing prior to surgery per protocol; AC to be held per protocol   - Overnight observation recommended    Nga Nur MD           [1]   Past Medical History:  Diagnosis Date    Essential (primary) hypertension     Mixed hyperlipidemia     Personal history of other diseases of the circulatory system 06/10/2014    History of hypertension    Personal history of other endocrine, nutritional and metabolic disease 06/10/2014    History of diabetes mellitus    Type 2 diabetes mellitus with hyperglycemia, with long-term current use of insulin    [2]   Past Surgical History:  Procedure Laterality Date    APPENDECTOMY  06/10/2014    Appendectomy    EYE SURGERY  06/10/2014    Eye Surgery    SHOULDER SURGERY  06/10/2014    Shoulder Surgery   [3]   Family History  Problem  Relation Name Age of Onset    Diabetes Mother      Heart disease Father      Diabetes Father      Stroke Father      Diabetes Sister      Drug abuse Brother

## 2025-06-24 ENCOUNTER — CLINICAL SUPPORT (OUTPATIENT)
Dept: PREADMISSION TESTING | Facility: HOSPITAL | Age: 65
End: 2025-06-24
Payer: COMMERCIAL

## 2025-06-24 NOTE — CPM/PAT NURSE NOTE
CPM/PAT Nurse Note      Name: Kathy Lara (Kathy Lara)  /Age: 1960/64 y.o.       Medical History[1]    Surgical History[2]    Patient Sexual activity questions deferred to the physician.    Family History[3]    Allergies[4]    Prior to Admission medications    Medication Sig Start Date End Date Taking? Authorizing Provider   allopurinol (Zyloprim) 100 mg tablet TAKE 1 TABLET BY MOUTH ONE TIME DAILY ONCE DAILY 25      atorvastatin (Lipitor) 40 mg tablet TAKE 1 TABLET BY MOUTH ONE TIME DAILY 25  Luisito Puente MD   cloNIDine (Catapres) 0.2 mg tablet TAKE 1 TABLET BY MOUTH THREE TIMES A DAY 25      cyanocobalamin (Vitamin B-12) 1,000 mcg tablet Take 1 tablet (1,000 mcg) by mouth once daily. 24   Luisito Puente MD   ferrous gluconate (Fergon) 324 mg (38 mg elemental) tablet TAKE 1 TABLET BY MOUTH ONCE DAILY IN THE MORNING. TAKE WITH MEALS 24  Luisito Puente MD   furosemide (Lasix) 40 mg tablet TAKE 1 TABLET BY MOUTH ONE TIME DAILY 25      hydrALAZINE (Apresoline) 25 mg tablet Take 1 tablet (25 mg) by mouth 2 times a day.  Patient not taking: Reported on 2025    Historical Provider, MD   insulin glargine (Lantus Solostar U-100 Insulin) 100 unit/mL (3 mL) pen INJECT SUBCUTANEOUSLY UP TO 50 UNITS DAILY 6/10/25 6/10/26  Luisito Puente MD   insulin lispro (HumaLOG KwikPen Insulin) 100 unit/mL injection INJECT UP TO 50 UNITS SUBCUTANEOUSLY THREE TIMES A DAY 9/3/24 9/3/25  Luisito Puente MD   losartan (Cozaar) 100 mg tablet TAKE 1 TABLET BY MOUTH ONE TIME DAILY 25      metoprolol succinate XL (Toprol-XL) 50 mg 24 hr tablet Take 1 tablet by mouth twice daily 25      omeprazole (PriLOSEC) 40 mg DR capsule Take 1 capsule (40 mg) by mouth once daily.    Historical Provider, MD   pioglitazone (Actos) 15 mg tablet TAKE 1/2 TABLET BY MOUTH ONE TIME DAILY 25  Luisito Puente MD   semaglutide (Ozempic) 2 mg/dose (8 mg/3 mL) pen injector Inject 2  mg under the skin 1 (one) time per week. 2/16/25   Luisito Puente MD        PAT ROS     DASI Risk Score    No data to display       Caprini DVT Assessment    No data to display       Modified Frailty Index    No data to display       [id number] Stroke Risk Points  Current as of just now        N/A 0 to 9 Points:      Last Change: N/A          The [id number] risk score (Lip CESAR, et al. 2009. © 2010 [employer] College [employer]) quantifies the risk of stroke for a patient with atrial fibrillation. For patients without atrial fibrillation or under the age of 18 this score appears as N/A. Higher score values generally indicate higher risk of stroke.        This score is not applicable to this patient. Components are not calculated.          Revised Cardiac Risk Index    No data to display       Apfel Simplified Score    No data to display       Risk Analysis Index Results This Encounter    No data found in the last 10 encounters.       Prodigy: High Risk  Total Score: 8              Prodigy Age Score           ARISCAT Score for Postoperative Pulmonary Complications    No data to display       Contreras Perioperative Risk for Myocardial Infarction or Cardiac Arrest (CLOVER)    No data to display         Nurse Plan of Action:                [1]   Past Medical History:  Diagnosis Date    Essential (primary) hypertension     Mixed hyperlipidemia     Personal history of other diseases of the circulatory system 06/10/2014    History of hypertension    Personal history of other endocrine, nutritional and metabolic disease 06/10/2014    History of diabetes mellitus    Type 2 diabetes mellitus with hyperglycemia, with long-term current use of insulin    [2]   Past Surgical History:  Procedure Laterality Date    APPENDECTOMY  06/10/2014    Appendectomy    EYE SURGERY  06/10/2014    Eye Surgery    SHOULDER SURGERY  06/10/2014    Shoulder Surgery   [3]   Family History  Problem Relation Name Age of Onset    Diabetes Mother       Heart disease Father      Diabetes Father      Stroke Father      Diabetes Sister      Drug abuse Brother     [4]   Allergies  Allergen Reactions    Morphine Other and Unknown     Nausea, GI upset

## 2025-06-27 ENCOUNTER — PRE-ADMISSION TESTING (OUTPATIENT)
Dept: PREADMISSION TESTING | Facility: HOSPITAL | Age: 65
End: 2025-06-27
Payer: COMMERCIAL

## 2025-06-27 ENCOUNTER — LAB (OUTPATIENT)
Dept: LAB | Facility: HOSPITAL | Age: 65
End: 2025-06-27
Payer: COMMERCIAL

## 2025-06-27 VITALS
OXYGEN SATURATION: 98 % | SYSTOLIC BLOOD PRESSURE: 123 MMHG | HEIGHT: 60 IN | RESPIRATION RATE: 18 BRPM | HEART RATE: 71 BPM | TEMPERATURE: 97.3 F | DIASTOLIC BLOOD PRESSURE: 65 MMHG | BODY MASS INDEX: 44.54 KG/M2 | WEIGHT: 226.85 LBS

## 2025-06-27 DIAGNOSIS — Z01.818 PREPROCEDURAL EXAMINATION: ICD-10-CM

## 2025-06-27 DIAGNOSIS — E11.65 TYPE 2 DIABETES MELLITUS WITH HYPERGLYCEMIA, WITH LONG-TERM CURRENT USE OF INSULIN: Primary | ICD-10-CM

## 2025-06-27 DIAGNOSIS — I10 ESSENTIAL (PRIMARY) HYPERTENSION: ICD-10-CM

## 2025-06-27 DIAGNOSIS — Z79.4 LONG TERM (CURRENT) USE OF INSULIN (MULTI): ICD-10-CM

## 2025-06-27 DIAGNOSIS — Z01.818 ENCOUNTER FOR OTHER PREPROCEDURAL EXAMINATION: ICD-10-CM

## 2025-06-27 DIAGNOSIS — Z79.4 TYPE 2 DIABETES MELLITUS WITH HYPERGLYCEMIA, WITH LONG-TERM CURRENT USE OF INSULIN: Primary | ICD-10-CM

## 2025-06-27 DIAGNOSIS — C54.1 ENDOMETRIAL CANCER (MULTI): ICD-10-CM

## 2025-06-27 DIAGNOSIS — E11.65 TYPE 2 DIABETES MELLITUS WITH HYPERGLYCEMIA (MULTI): Primary | ICD-10-CM

## 2025-06-27 LAB
ABO GROUP (TYPE) IN BLOOD: NORMAL
ALBUMIN SERPL BCP-MCNC: 4 G/DL (ref 3.4–5)
ALP SERPL-CCNC: 76 U/L (ref 33–136)
ALT SERPL W P-5'-P-CCNC: 18 U/L (ref 7–45)
ANION GAP SERPL CALC-SCNC: 14 MMOL/L (ref 10–20)
ANTIBODY SCREEN: NORMAL
AST SERPL W P-5'-P-CCNC: 18 U/L (ref 9–39)
BASOPHILS # BLD AUTO: 0.1 X10*3/UL (ref 0–0.1)
BASOPHILS NFR BLD AUTO: 1 %
BILIRUB SERPL-MCNC: 0.8 MG/DL (ref 0–1.2)
BUN SERPL-MCNC: 28 MG/DL (ref 6–23)
CALCIUM SERPL-MCNC: 9.2 MG/DL (ref 8.6–10.3)
CHLORIDE SERPL-SCNC: 102 MMOL/L (ref 98–107)
CO2 SERPL-SCNC: 29 MMOL/L (ref 21–32)
CREAT SERPL-MCNC: 2.48 MG/DL (ref 0.5–1.05)
EGFRCR SERPLBLD CKD-EPI 2021: 21 ML/MIN/1.73M*2
EOSINOPHIL # BLD AUTO: 0.22 X10*3/UL (ref 0–0.7)
EOSINOPHIL NFR BLD AUTO: 2.3 %
ERYTHROCYTE [DISTWIDTH] IN BLOOD BY AUTOMATED COUNT: 13.2 % (ref 11.5–14.5)
GLUCOSE SERPL-MCNC: 187 MG/DL (ref 74–99)
HCT VFR BLD AUTO: 36.6 % (ref 36–46)
HGB BLD-MCNC: 11.8 G/DL (ref 12–16)
IMM GRANULOCYTES # BLD AUTO: 0.03 X10*3/UL (ref 0–0.7)
IMM GRANULOCYTES NFR BLD AUTO: 0.3 % (ref 0–0.9)
LYMPHOCYTES # BLD AUTO: 3.64 X10*3/UL (ref 1.2–4.8)
LYMPHOCYTES NFR BLD AUTO: 38 %
MCH RBC QN AUTO: 29.6 PG (ref 26–34)
MCHC RBC AUTO-ENTMCNC: 32.2 G/DL (ref 32–36)
MCV RBC AUTO: 92 FL (ref 80–100)
MONOCYTES # BLD AUTO: 0.51 X10*3/UL (ref 0.1–1)
MONOCYTES NFR BLD AUTO: 5.3 %
NEUTROPHILS # BLD AUTO: 5.09 X10*3/UL (ref 1.2–7.7)
NEUTROPHILS NFR BLD AUTO: 53.1 %
NRBC BLD-RTO: 0 /100 WBCS (ref 0–0)
PLATELET # BLD AUTO: 230 X10*3/UL (ref 150–450)
POTASSIUM SERPL-SCNC: 4.1 MMOL/L (ref 3.5–5.3)
PROT SERPL-MCNC: 6.8 G/DL (ref 6.4–8.2)
RBC # BLD AUTO: 3.98 X10*6/UL (ref 4–5.2)
RH FACTOR (ANTIGEN D): NORMAL
SODIUM SERPL-SCNC: 141 MMOL/L (ref 136–145)
WBC # BLD AUTO: 9.6 X10*3/UL (ref 4.4–11.3)

## 2025-06-27 PROCEDURE — 85025 COMPLETE CBC W/AUTO DIFF WBC: CPT

## 2025-06-27 PROCEDURE — 80053 COMPREHEN METABOLIC PANEL: CPT

## 2025-06-27 PROCEDURE — 99204 OFFICE O/P NEW MOD 45 MIN: CPT | Performed by: NURSE PRACTITIONER

## 2025-06-27 PROCEDURE — 86850 RBC ANTIBODY SCREEN: CPT

## 2025-06-27 PROCEDURE — 93010 ELECTROCARDIOGRAM REPORT: CPT | Performed by: INTERNAL MEDICINE

## 2025-06-27 PROCEDURE — 36415 COLL VENOUS BLD VENIPUNCTURE: CPT

## 2025-06-27 PROCEDURE — 86901 BLOOD TYPING SEROLOGIC RH(D): CPT

## 2025-06-27 PROCEDURE — 86900 BLOOD TYPING SEROLOGIC ABO: CPT

## 2025-06-27 RX ORDER — THIST/DAND/TURMER/ART/HERB/AA 175MG-50MG
CAPSULE ORAL
COMMUNITY

## 2025-06-27 ASSESSMENT — ENCOUNTER SYMPTOMS
RESPIRATORY NEGATIVE: 1
ARTHRALGIAS: 1
CARDIOVASCULAR NEGATIVE: 1
NECK NEGATIVE: 1
CONSTITUTIONAL NEGATIVE: 1
GASTROINTESTINAL NEGATIVE: 1

## 2025-06-27 NOTE — PREPROCEDURE INSTRUCTIONS
Medication List            Accurate as of June 27, 2025  1:49 PM. Always use your most recent med list.                allopurinol 100 mg tablet  Commonly known as: Zyloprim  TAKE 1 TABLET BY MOUTH ONE TIME DAILY ONCE DAILY  Medication Adjustments for Surgery: Take on the morning of surgery     atorvastatin 40 mg tablet  Commonly known as: Lipitor  TAKE 1 TABLET BY MOUTH ONE TIME DAILY  Medication Adjustments for Surgery: Take on the morning of surgery     cloNIDine 0.2 mg tablet  Commonly known as: Catapres  TAKE 1 TABLET BY MOUTH THREE TIMES A DAY  Medication Adjustments for Surgery: Take on the morning of surgery     cyanocobalamin 1,000 mcg tablet  Commonly known as: Vitamin B-12  Take 1 tablet (1,000 mcg) by mouth once daily.  Medication Adjustments for Surgery: Do Not take on the morning of surgery     evening primrose oil 1,300 mg capsule  Additional Medication Adjustments for Surgery: Take last dose 7 days before surgery     ferrous gluconate 324 mg (38 mg elemental) tablet  Commonly known as: Fergon  TAKE 1 TABLET BY MOUTH ONCE DAILY IN THE MORNING. TAKE WITH MEALS  Medication Adjustments for Surgery: Do Not take on the morning of surgery     furosemide 40 mg tablet  Commonly known as: Lasix  TAKE 1 TABLET BY MOUTH ONE TIME DAILY  Medication Adjustments for Surgery: Take on the morning of surgery     HumaLOG KwikPen Insulin 100 unit/mL pen  Generic drug: insulin lispro  INJECT UP TO 50 UNITS SUBCUTANEOUSLY THREE TIMES A DAY  Medication Adjustments for Surgery: Do Not take on the morning of surgery     Lantus Solostar U-100 Insulin 100 unit/mL (3 mL) pen  Generic drug: insulin glargine  INJECT SUBCUTANEOUSLY UP TO 50 UNITS DAILY  Notes to patient: PT STATES SHE IS TAKING 40 UNITS AT HS.  PT INSTRUCTED TO ECREAS BY 1/2 THE NIGHT BEFORE SURGERY , PT TO TAKE ONLY 20 UNITS THE NIGHT BEFORE SURGERY      losartan 100 mg tablet  Commonly known as: Cozaar  TAKE 1 TABLET BY MOUTH ONE TIME DAILY  Medication  Adjustments for Surgery: Take last dose 1 day (24 hours) before surgery     metoprolol succinate XL 50 mg 24 hr tablet  Commonly known as: Toprol-XL  Take 1 tablet by mouth twice daily  Medication Adjustments for Surgery: Take on the morning of surgery     omeprazole 40 mg DR capsule  Commonly known as: PriLOSEC  Medication Adjustments for Surgery: Take on the morning of surgery     Ozempic 2 mg/dose (8 mg/3 mL) pen injector  Generic drug: semaglutide  Inject 2 mg under the skin 1 (one) time per week.  Medication Adjustments for Surgery: Take last dose 1 day (24 hours) before surgery     pioglitazone 15 mg tablet  Commonly known as: Actos  TAKE 1/2 TABLET BY MOUTH ONE TIME DAILY  Medication Adjustments for Surgery: Do Not take on the morning of surgery                      Preoperative Fasting Guidelines      Why must I stop eating and drinking before surgery?   With anesthesia, food or liquid in your stomach can enter your lungs causing serious complications   GLP-1 medications can slow the movement of food through your stomach and intestines.  This further increases the risk of food entering your lungs with anesthesia     When do I need to stop eating and drinking before my surgery?   To help ensure food has passed out of your stomach, START a clear liquid diet 24 hours before your surgery   On the day of your surgery/procedure, STOP all clear liquids 2 hours before your arrival time to the hospital/facility      A clear liquid diet consists of clear liquids and foods that melt into a clear liquid (i.e. gelatin) and excludes solid foods and liquids you cannot see through (i.e. milk). Clears can and should contain sugar to obtain a sufficient number of calories.  A clear liquid diet includes   Clear, fat-free broth   Clear nutritional drinks   Pulp-free popsicles, vegetable and fruit juice   Gelatin   Coffee and tea without creamer or milk   Clear soda and sports drinks      Diabetic Patients   Clear liquids should  not be sugar-free    Check your blood glucose levels as you normally do   If you have symptoms of low blood glucose (shakiness, sweating, dizziness, confusion) or high blood glucose (dry mouth, excessive thirst, frequent urination, blurry vision), check your blood glucose level   For low blood glucose increase your consumption of sugar-containing clear liquid    For high blood glucose, decrease your consumption of sugar-containing clears and treat as you normally would   If symptoms persist seek medical attention      Examples of GLP-1 Medications   Murali Swartz         CONTACT SURGEON'S OFFICE IF YOU DEVELOP:  * Fever = 100.4 F   * New respiratory symptoms (e.g. cough, shortness of breath, respiratory distress, sore throat)  * Recent loss of taste or smell  *Flu like symptoms such as headache, fatigue or gastrointestinal symptoms  * You develop any open sores, shingles, burning or painful urination   AND/OR:  * You no longer wish to have the surgery.  * Any other personal circumstances change that may lead to the need to cancel or defer this surgery.  *You were admitted to any hospital within one week of your planned procedure.    SMOKING:  *Quitting smoking can make a huge difference to your health and recovery from surgery.    *If you need help with quitting, call 6-142-QUIT-NOW.    SURGICAL TIME:  *You will be contacted between 2 p.m. and 6 p.m. the business day before your surgery with your arrival time.  *If you haven't received a call by 6pm, call [phone].  *Scheduled surgery times may change and you will be notified if this occurs-check your personal voicemail for any updates.    ON THE MORNING OF SURGERY:  *Wear comfortable, loose fitting clothing.   *Do not use moisturizers, creams, lotions or perfume.  *All jewelry and valuables should be left at home.  *Prosthetic devices such as contact lenses, hearing aids,  dentures, eyelash extensions, hairpins and body piercing must be removed before surgery.    BRING WITH YOU:  *Photo ID and insurance card  *Current list of medications and allergies  *Pacemaker/Defibrillator/Heart stent cards  *CPAP machine and mask  *Slings/splints/crutches  *Copy of your complete Advanced Directive/DHPOA-if applicable  *Neurostimulator implant remote    PARKING AND ARRIVAL:  *Check in at the Main Entrance desk and let them know you are here for surgery.  *You will be directed to the 2nd floor surgical waiting area.    IF YOU ARE HAVING OUTPATIENT/SAME DAY SURGERY:  *A responsible adult MUST accompany you at the time of discharge and stay with you for 24 hours after your surgery.  *You may NOT drive yourself home after surgery.  *You may use a taxi or ride sharing service ([employer], [employer]) to return home ONLY if you are accompanied by a friend or family member.  *Instructions for resuming your medications will be provided by your surgeon.

## 2025-06-27 NOTE — CPM/PAT H&P
CPM/PAT Evaluation       Name: Kathy Lara (Kathy Lara)  /Age: 1960/64 y.o.     SURGEON :DR MARIA TERESA NUÑEZ   Surgery, Date, and Length:  Robotic Laparoscopic Total Hysterectomy; Bilateral Salpingo Oophorectomy; [place] Lymph Node Biopsies 25    HPI:  This a 64 y.o. fe-male who presents for presurgical evaluation for for above mentioned procedure. Pt reports post menopausal bleeding . She had an endometrial biopsy which showed endometrial adenocarcinoma.   . After discussion of the risks and benefits with Dr. NUÑEZ the patient elects to proceed with the planned procedure.       Past Medical History:   Diagnosis Date    Anemia     CKD (chronic kidney disease), stage IV (Multi)     DR JUICE DUBOIS    CVA (cerebral vascular accident) (Multi)     Hemorrhagic    Essential (primary) hypertension     GERD (gastroesophageal reflux disease)     Gout     ALLOPURINAL    Mixed hyperlipidemia     Personal history of other diseases of the circulatory system 06/10/2014    History of hypertension    Personal history of other endocrine, nutritional and metabolic disease 06/10/2014    History of diabetes mellitus    Snores     STOP-BANG=5    Type 2 diabetes mellitus with hyperglycemia, with long-term current use of insulin     A1C=8.8 ON 25       Past Surgical History:   Procedure Laterality Date    APPENDECTOMY  06/10/2014    Appendectomy    CATARACT EXTRACTION EXTRACAPSULAR W/ INTRAOCULAR LENS IMPLANTATION      COLONOSCOPY      ESOPHAGOGASTRODUODENOSCOPY      EYE SURGERY  06/10/2014    Eye Surgery    SHOULDER SURGERY  06/10/2014    Shoulder Surgery     Anesthesia History  Pt denies any past history of anesthetic complications such as PONV, awareness, prolonged sedation, dental damage, aspiration, cardiac arrest, difficult intubation, difficult I.V. access or unexpected hospital admissions.  NO malignant hyperthermia and or pseudo cholinesterase deficiency.    The patient IS NOT  a [employer] and WILL  accept blood and blood products if medically indicated.   No history of blood transfusions .Type and screen  sent.       Social History  Social History     Substance and Sexual Activity   Drug Use Defer      Social History     Substance and Sexual Activity   Alcohol Use Not Currently      Social History     Tobacco Use   Smoking Status Never   Smokeless Tobacco Never        Family History   Problem Relation Name Age of Onset    Diabetes Mother      Heart disease Father      Diabetes Father      Stroke Father      Diabetes Sister      Drug abuse Brother         Allergies   Allergen Reactions    Morphine Other and Unknown     Nausea, GI upset       Prior to Admission medications    Medication Sig Start Date End Date Taking? Authorizing Provider   allopurinol (Zyloprim) 100 mg tablet TAKE 1 TABLET BY MOUTH ONE TIME DAILY ONCE DAILY 4/23/25      atorvastatin (Lipitor) 40 mg tablet TAKE 1 TABLET BY MOUTH ONE TIME DAILY 2/24/25 2/24/26  Luisito Puente MD   cloNIDine (Catapres) 0.2 mg tablet TAKE 1 TABLET BY MOUTH THREE TIMES A DAY 4/23/25      cyanocobalamin (Vitamin B-12) 1,000 mcg tablet Take 1 tablet (1,000 mcg) by mouth once daily. 8/30/24   Luisito Puente MD   ferrous gluconate (Fergon) 324 mg (38 mg elemental) tablet TAKE 1 TABLET BY MOUTH ONCE DAILY IN THE MORNING. TAKE WITH MEALS 6/25/24 7/12/25  Luisito Puente MD   furosemide (Lasix) 40 mg tablet TAKE 1 TABLET BY MOUTH ONE TIME DAILY 4/22/25      hydrALAZINE (Apresoline) 25 mg tablet Take 1 tablet (25 mg) by mouth 2 times a day.  Patient not taking: Reported on 6/12/2025    Historical Provider, MD   insulin glargine (Lantus Solostar U-100 Insulin) 100 unit/mL (3 mL) pen INJECT SUBCUTANEOUSLY UP TO 50 UNITS DAILY  Patient taking differently: once daily at bedtime. 6/10/25 6/10/26  Luisito Puente MD   insulin lispro (HumaLOG KwikPen Insulin) 100 unit/mL injection INJECT UP TO 50 UNITS SUBCUTANEOUSLY THREE TIMES A DAY  Patient not taking: Reported on 6/24/2025  9/3/24 9/3/25  Luisito Puente MD   losartan (Cozaar) 100 mg tablet TAKE 1 TABLET BY MOUTH ONE TIME DAILY 4/23/25      metoprolol succinate XL (Toprol-XL) 50 mg 24 hr tablet Take 1 tablet by mouth twice daily 4/23/25      omeprazole (PriLOSEC) 40 mg DR capsule Take 1 capsule (40 mg) by mouth once daily.    Historical Provider, MD   pioglitazone (Actos) 15 mg tablet TAKE 1/2 TABLET BY MOUTH ONE TIME DAILY  Patient taking differently: Take by mouth once daily. Pt states takes every other day 1/27/25 1/27/26  Luisito Puente MD   semaglutide (Ozempic) 2 mg/dose (8 mg/3 mL) pen injector Inject 2 mg under the skin 1 (one) time per week. 2/16/25   Luisito Puente MD        PAT ROS:   Constitutional:   neg    Neuro/Psych:   Eyes:   Ears:   Nose:   Mouth:   neg    Throat:   neg    Neck:   neg    Cardio:   neg    Respiratory:   neg    Endocrine:   GI:   neg    :   neg    Musculoskeletal:    arthralgias  Hematologic:   neg    Skin:  neg        Physical Exam  Vitals reviewed.   Constitutional:       Appearance: She is obese.   HENT:      Head: Normocephalic.   Eyes:      Extraocular Movements: Extraocular movements intact.      Pupils: Pupils are equal, round, and reactive to light.   Cardiovascular:      Rate and Rhythm: Normal rate and regular rhythm.      Pulses: Normal pulses.      Heart sounds: Normal heart sounds.   Pulmonary:      Effort: Pulmonary effort is normal.      Breath sounds: Normal breath sounds.   Musculoskeletal:         General: Normal range of motion.      Cervical back: Normal range of motion.   Skin:     General: Skin is warm and dry.   Neurological:      Mental Status: She is alert and oriented to person, place, and time.   Psychiatric:         Mood and Affect: Mood normal.         Behavior: Behavior normal.          PAT AIRWAY:   Airway:     Mallampati::  II   EDENTOULOUS W EXCEPTION ONE LOWER LEFT TOOTH       Testing/Diagnostic:   EKG IN EPIC     Lab Results   Component Value Date    WBC 9.6  06/27/2025    HGB 11.8 (L) 06/27/2025    HCT 36.6 06/27/2025    MCV 92 06/27/2025     06/27/2025     Results from last 7 days   Lab Units 06/27/25  1412   SODIUM mmol/L 141   POTASSIUM mmol/L 4.1   CHLORIDE mmol/L 102   CO2 mmol/L 29   BUN mg/dL 28*   CREATININE mg/dL 2.48*   CALCIUM mg/dL 9.2   PROTEIN TOTAL g/dL 6.8   BILIRUBIN TOTAL mg/dL 0.8   ALK PHOS U/L 76   ALT U/L 18   AST U/L 18   GLUCOSE mg/dL 187*         Patient Specialist/PCP:   /65   Pulse 71   Temp 36.3 °C (97.3 °F)   Resp 18   Ht (!) 1.524 m (5')   Wt 103 kg (226 lb 13.7 oz)   SpO2 98%   BMI 44.30 kg/m²     ASSESSMENT/PLAN    Patient is a 64 year-old  scheduled for Robotic Laparoscopic Total Hysterectomy; Bilateral Salpingo Oophorectomy; [place] Lymph Node Biopsies  with Dr. hinojosa  on  7/1/25 .  CARDIOVASCULAR:  RCRI score / Risk: The patients score is 2 based on history . Per ACC/AHA guidelines this places her  at  10% risk for MACE undergoing a intermediate  risk procedure . The patient has the following risk factors:IDDM,CVA  Functional Capacity: The patients exercise tolerance is  4  METS. This is based on the patient's  abililty to ascend a flight of stairs  and walk 2 block w/o chest pain or other anginal equivalents.  . Patient denies  active cardiac symptoms or anginal equivalents .    CVA 2007:  Pt states hemorrhagic stroke in 2007 due to uncontrolled BP. Pt BP under  good control at present.     PULMONARY:  The patient has the following factors that place them at increased risk of perioperative pulmonary complications;age greater than 65/BMI greater than 27/GERD/greater than 2.5 hour procedure.  Postoperatively the patient would benefit from early pulmonary toilet/incentive spirometry q 1-2 hours while awake/pulse oximetry/cautious use of respiratory depressant medications such as opioids/elevate the HOB/oral hygiene.    CKD :  The patient has had chronic kidney disease d is currently at stage 4 ,with creatinine  levels that have been fluctuating ,basically stable with stage .   Recommendations: Avoid intraoperative hypotension. Avoid nephrotoxic drugs and radicontrast dyes.  Recheck BMP periodically in the post op period.    IDDM:  The patient has 20 year history of  diabetes.Currently the patient manages their diabetes with oral agents/insulin, her  recent A1C was 8.8  on 5/16/25.  The  patient  was told to hold regular insulin and hold oral antihyperglycemic medications day of surgery .  The night before surgery the patient was instructed to administer  half their long acting insulin.  Pt instructed to hold ozempic 24 hrs prior to surgery .Clear liquid diet instructions given for pre operative AVS .     ANEMIA :  The patient has a history of anemia.  The patient’s anemia appears stable when compared to prior labs.  Type and screen obtained.      DVT:  CAPRINI SCORE=10  The patient has the following factors that increase her  Risk for thrombus formation ; Virchow's triad ,age 64, bmi 43,malignancy  , Surgical procedure >2 hrs  procedure .    Recommendations: DVT prophylaxis  per Dr. Nur  protocol . SCD's, KATHIA's, and early ambulation are recommended. Heparin or LMWH is recommended for the very high risk .      Risk assessment complete.  Patient is scheduled for  intermediate  surgical risk procedure.  Patient is considered an increased notprohibitive  risk to proceed with the planned procedure.      Preoperative medication instructions were provided and reviewed with the patient.  Any additional testing or evaluation was explained to the patient.  Nothing by mouth instructions were discussed and patient's questions were answered prior to conclusion to this encounter.  Patient verbalized understanding of preoperative instructions given in preadmission testing; discharge instructions available in EMR.

## 2025-06-27 NOTE — CPM/PAT NURSE NOTE
CPM/PAT Nurse Note      Name: Kathy Lara (Kathy Lara)  /Age: 1960/64 y.o.       Medical History[1]    Surgical History[2]    Patient Sexual activity questions deferred to the physician.    Family History[3]    Allergies[4]    Prior to Admission medications    Medication Sig Start Date End Date Taking? Authorizing Provider   allopurinol (Zyloprim) 100 mg tablet TAKE 1 TABLET BY MOUTH ONE TIME DAILY ONCE DAILY 25  Yes    atorvastatin (Lipitor) 40 mg tablet TAKE 1 TABLET BY MOUTH ONE TIME DAILY 25 Yes Luisito Puente MD   cloNIDine (Catapres) 0.2 mg tablet TAKE 1 TABLET BY MOUTH THREE TIMES A DAY 25  Yes    cyanocobalamin (Vitamin B-12) 1,000 mcg tablet Take 1 tablet (1,000 mcg) by mouth once daily. 24  Yes Luisito Puente MD   evening primrose oil 1,300 mg capsule Take by mouth.   Yes Historical Provider, MD   ferrous gluconate (Fergon) 324 mg (38 mg elemental) tablet TAKE 1 TABLET BY MOUTH ONCE DAILY IN THE MORNING. TAKE WITH MEALS 24 Yes Luisito Puente MD   furosemide (Lasix) 40 mg tablet TAKE 1 TABLET BY MOUTH ONE TIME DAILY 25  Yes    insulin glargine (Lantus Solostar U-100 Insulin) 100 unit/mL (3 mL) pen INJECT SUBCUTANEOUSLY UP TO 50 UNITS DAILY  Patient taking differently: Inject 40 Units under the skin once daily at bedtime. 6/10/25 6/10/26 Yes Luisito Puente MD   losartan (Cozaar) 100 mg tablet TAKE 1 TABLET BY MOUTH ONE TIME DAILY 25  Yes    metoprolol succinate XL (Toprol-XL) 50 mg 24 hr tablet Take 1 tablet by mouth twice daily 25  Yes    omeprazole (PriLOSEC) 40 mg DR capsule Take 1 capsule (40 mg) by mouth once daily.   Yes Historical Provider, MD   pioglitazone (Actos) 15 mg tablet TAKE 1/2 TABLET BY MOUTH ONE TIME DAILY  Patient taking differently: Take by mouth once daily. Pt states takes every other day 25 Yes Luisito Puente MD   semaglutide (Ozempic) 2 mg/dose (8 mg/3 mL) pen injector Inject 2 mg under the skin 1  (one) time per week.  Patient taking differently: Inject 2 mg under the skin 1 (one) time per week. MONDAY 2/16/25  Yes Luisito Puente MD   insulin lispro (HumaLOG KwikPen Insulin) 100 unit/mL injection INJECT UP TO 50 UNITS SUBCUTANEOUSLY THREE TIMES A DAY  Patient not taking: Reported on 6/24/2025 9/3/24 9/3/25  Luisito Puente MD   hydrALAZINE (Apresoline) 25 mg tablet Take 1 tablet (25 mg) by mouth 2 times a day.  Patient not taking: Reported on 6/12/2025 6/27/25  Historical Provider, MD MARIAM SIMPSON     DASELIANE Risk Score    No data to display       Caprini DVT Assessment    No data to display       Modified Frailty Index    No data to display       [id number] Stroke Risk Points  Current as of just now        N/A 0 to 9 Points:      Last Change: N/A          The [id number] risk score (Lip CESAR, et al. 2009. © 2010 [employer]) quantifies the risk of stroke for a patient with atrial fibrillation. For patients without atrial fibrillation or under the age of 18 this score appears as N/A. Higher score values generally indicate higher risk of stroke.        This score is not applicable to this patient. Components are not calculated.          Revised Cardiac Risk Index    No data to display       Apfel Simplified Score    No data to display       Risk Analysis Index Results This Encounter    No data found in the last 10 encounters.       Stop Bang Score      Flowsheet Row Clinical Support from 6/24/2025 in [facility]   Do you snore loudly? 1 filed at 06/24/2025 1656   Do you often feel tired or fatigued after your sleep? 0 filed at 06/24/2025 1656   Has anyone ever observed you stop breathing in your sleep? 0 filed at 06/24/2025 1656   Do you have or are you being treated for high blood pressure? 1 filed at 06/24/2025 1656   Recent BMI (Calculated) 43.4 filed at 06/24/2025 1656   Is BMI greater than 35 kg/m2? 1=Yes filed at 06/24/2025 1656   Age older than 50 years old? 1=Yes  filed at 06/24/2025 1656   Is your neck circumference greater than 17 inches (Male) or 16 inches (Female)? 1 filed at 06/24/2025 1656   Gender - Male 0=No filed at 06/24/2025 1656   STOP-BANG Total Score 5 filed at 06/24/2025 1656          Prodigy: High Risk  Total Score: 8              Prodigy Age Score           ARISCAT Score for Postoperative Pulmonary Complications    No data to display       Ben Perioperative Risk for Myocardial Infarction or Cardiac Arrest (CLOVER)    No data to display         Nurse Plan of Action:     After Visit Summary (AVS) reviewed and patient verbalized good understanding of medications and NPO instructions.   24 hour pre-op clear liquid diet reviewed, pt verbalizes understanding           [1]   Past Medical History:  Diagnosis Date    Anemia     CKD (chronic kidney disease), stage IV (Multi)     DR JUICE DUBOIS    CVA (cerebral vascular accident) (Multi) 2007    Hemorrhagic    Essential (primary) hypertension     GERD (gastroesophageal reflux disease)     Gout     ALLOPURINAL    Mixed hyperlipidemia     Personal history of other diseases of the circulatory system 06/10/2014    History of hypertension    Personal history of other endocrine, nutritional and metabolic disease 06/10/2014    History of diabetes mellitus    Snores     STOP-BANG=5    Type 2 diabetes mellitus with hyperglycemia, with long-term current use of insulin 1995    A1C=8.8 ON 5/16/25   [2]   Past Surgical History:  Procedure Laterality Date    APPENDECTOMY  06/10/2014    Appendectomy    CATARACT EXTRACTION EXTRACAPSULAR W/ INTRAOCULAR LENS IMPLANTATION      COLONOSCOPY      ESOPHAGOGASTRODUODENOSCOPY      EYE SURGERY  06/10/2014    Eye Surgery    SHOULDER SURGERY  06/10/2014    Shoulder Surgery   [3]   Family History  Problem Relation Name Age of Onset    Diabetes Mother      Heart disease Father      Diabetes Father      Stroke Father      Diabetes Sister      Drug abuse Brother     [4]   Allergies  Allergen Reactions     Morphine Headache     Nausea, GI upset

## 2025-06-30 ENCOUNTER — ANESTHESIA EVENT (OUTPATIENT)
Dept: OPERATING ROOM | Facility: HOSPITAL | Age: 65
End: 2025-06-30
Payer: COMMERCIAL

## 2025-06-30 ENCOUNTER — HOSPITAL ENCOUNTER (OUTPATIENT)
Dept: CARDIOLOGY | Facility: HOSPITAL | Age: 65
Discharge: HOME | End: 2025-06-30
Payer: COMMERCIAL

## 2025-06-30 DIAGNOSIS — I10 ESSENTIAL (PRIMARY) HYPERTENSION: ICD-10-CM

## 2025-06-30 LAB
ATRIAL RATE: 67 BPM
P AXIS: 18 DEGREES
P OFFSET: 174 MS
P ONSET: 115 MS
PR INTERVAL: 190 MS
Q ONSET: 210 MS
QRS COUNT: 11 BEATS
QRS DURATION: 88 MS
QT INTERVAL: 450 MS
QTC CALCULATION(BAZETT): 475 MS
QTC FREDERICIA: 467 MS
R AXIS: -28 DEGREES
T AXIS: 24 DEGREES
T OFFSET: 435 MS
VENTRICULAR RATE: 67 BPM

## 2025-06-30 PROCEDURE — 93005 ELECTROCARDIOGRAM TRACING: CPT

## 2025-07-01 ENCOUNTER — HOSPITAL ENCOUNTER (OUTPATIENT)
Facility: HOSPITAL | Age: 65
Discharge: HOME | End: 2025-07-02
Attending: STUDENT IN AN ORGANIZED HEALTH CARE EDUCATION/TRAINING PROGRAM | Admitting: STUDENT IN AN ORGANIZED HEALTH CARE EDUCATION/TRAINING PROGRAM
Payer: COMMERCIAL

## 2025-07-01 ENCOUNTER — SOCIAL WORK (OUTPATIENT)
Dept: CASE MANAGEMENT | Facility: HOSPITAL | Age: 65
End: 2025-07-01

## 2025-07-01 ENCOUNTER — ANESTHESIA (OUTPATIENT)
Dept: OPERATING ROOM | Facility: HOSPITAL | Age: 65
End: 2025-07-01
Payer: COMMERCIAL

## 2025-07-01 DIAGNOSIS — C54.1 ENDOMETRIAL CANCER (MULTI): Primary | ICD-10-CM

## 2025-07-01 LAB
ABO GROUP (TYPE) IN BLOOD: NORMAL
GLUCOSE BLD MANUAL STRIP-MCNC: 159 MG/DL (ref 74–99)
GLUCOSE BLD MANUAL STRIP-MCNC: 181 MG/DL (ref 74–99)
GLUCOSE BLD MANUAL STRIP-MCNC: 230 MG/DL (ref 74–99)
GLUCOSE BLD MANUAL STRIP-MCNC: 238 MG/DL (ref 74–99)
RH FACTOR (ANTIGEN D): NORMAL

## 2025-07-01 PROCEDURE — 2500000001 HC RX 250 WO HCPCS SELF ADMINISTERED DRUGS (ALT 637 FOR MEDICARE OP): Performed by: STUDENT IN AN ORGANIZED HEALTH CARE EDUCATION/TRAINING PROGRAM

## 2025-07-01 PROCEDURE — 7100000011 HC EXTENDED STAY RECOVERY HOURLY - NURSING UNIT

## 2025-07-01 PROCEDURE — 96372 THER/PROPH/DIAG INJ SC/IM: CPT | Mod: 59 | Performed by: STUDENT IN AN ORGANIZED HEALTH CARE EDUCATION/TRAINING PROGRAM

## 2025-07-01 PROCEDURE — 3700000002 HC GENERAL ANESTHESIA TIME - EACH INCREMENTAL 1 MINUTE: Performed by: STUDENT IN AN ORGANIZED HEALTH CARE EDUCATION/TRAINING PROGRAM

## 2025-07-01 PROCEDURE — 38570 LAPAROSCOPY LYMPH NODE BIOP: CPT | Performed by: STUDENT IN AN ORGANIZED HEALTH CARE EDUCATION/TRAINING PROGRAM

## 2025-07-01 PROCEDURE — 2500000004 HC RX 250 GENERAL PHARMACY W/ HCPCS (ALT 636 FOR OP/ED): Mod: JW | Performed by: NURSE ANESTHETIST, CERTIFIED REGISTERED

## 2025-07-01 PROCEDURE — 88309 TISSUE EXAM BY PATHOLOGIST: CPT | Performed by: STUDENT IN AN ORGANIZED HEALTH CARE EDUCATION/TRAINING PROGRAM

## 2025-07-01 PROCEDURE — A58571 PR LAPAROSCOPY W TOT HYSTERECTUTERUS <=250 GRAM  W TUBE/OVARY: Performed by: STUDENT IN AN ORGANIZED HEALTH CARE EDUCATION/TRAINING PROGRAM

## 2025-07-01 PROCEDURE — 7100000002 HC RECOVERY ROOM TIME - EACH INCREMENTAL 1 MINUTE: Performed by: STUDENT IN AN ORGANIZED HEALTH CARE EDUCATION/TRAINING PROGRAM

## 2025-07-01 PROCEDURE — 88307 TISSUE EXAM BY PATHOLOGIST: CPT | Performed by: STUDENT IN AN ORGANIZED HEALTH CARE EDUCATION/TRAINING PROGRAM

## 2025-07-01 PROCEDURE — 2500000004 HC RX 250 GENERAL PHARMACY W/ HCPCS (ALT 636 FOR OP/ED): Performed by: STUDENT IN AN ORGANIZED HEALTH CARE EDUCATION/TRAINING PROGRAM

## 2025-07-01 PROCEDURE — 2500000005 HC RX 250 GENERAL PHARMACY W/O HCPCS: Performed by: NURSE ANESTHETIST, CERTIFIED REGISTERED

## 2025-07-01 PROCEDURE — 2780000003 HC OR 278 NO HCPCS: Performed by: STUDENT IN AN ORGANIZED HEALTH CARE EDUCATION/TRAINING PROGRAM

## 2025-07-01 PROCEDURE — 3600000017 HC OR TIME - EACH INCREMENTAL 1 MINUTE - PROCEDURE LEVEL SIX: Performed by: STUDENT IN AN ORGANIZED HEALTH CARE EDUCATION/TRAINING PROGRAM

## 2025-07-01 PROCEDURE — 2500000005 HC RX 250 GENERAL PHARMACY W/O HCPCS: Performed by: STUDENT IN AN ORGANIZED HEALTH CARE EDUCATION/TRAINING PROGRAM

## 2025-07-01 PROCEDURE — 50715 RELEASE OF URETER: CPT | Performed by: STUDENT IN AN ORGANIZED HEALTH CARE EDUCATION/TRAINING PROGRAM

## 2025-07-01 PROCEDURE — G0452 MOLECULAR PATHOLOGY INTERPR: HCPCS | Performed by: STUDENT IN AN ORGANIZED HEALTH CARE EDUCATION/TRAINING PROGRAM

## 2025-07-01 PROCEDURE — 82947 ASSAY GLUCOSE BLOOD QUANT: CPT | Mod: 91

## 2025-07-01 PROCEDURE — 0754T DGTZ GLS MCRSCP SLD LEVEL V: CPT | Mod: TC,AHULAB,WESLAB | Performed by: STUDENT IN AN ORGANIZED HEALTH CARE EDUCATION/TRAINING PROGRAM

## 2025-07-01 PROCEDURE — 2500000002 HC RX 250 W HCPCS SELF ADMINISTERED DRUGS (ALT 637 FOR MEDICARE OP, ALT 636 FOR OP/ED): Performed by: STUDENT IN AN ORGANIZED HEALTH CARE EDUCATION/TRAINING PROGRAM

## 2025-07-01 PROCEDURE — 2720000007 HC OR 272 NO HCPCS: Performed by: STUDENT IN AN ORGANIZED HEALTH CARE EDUCATION/TRAINING PROGRAM

## 2025-07-01 PROCEDURE — 3700000001 HC GENERAL ANESTHESIA TIME - INITIAL BASE CHARGE: Performed by: STUDENT IN AN ORGANIZED HEALTH CARE EDUCATION/TRAINING PROGRAM

## 2025-07-01 PROCEDURE — S2900 ROBOTIC SURGICAL SYSTEM: HCPCS | Performed by: STUDENT IN AN ORGANIZED HEALTH CARE EDUCATION/TRAINING PROGRAM

## 2025-07-01 PROCEDURE — A58571 PR LAPAROSCOPY W TOT HYSTERECTUTERUS <=250 GRAM  W TUBE/OVARY: Performed by: NURSE ANESTHETIST, CERTIFIED REGISTERED

## 2025-07-01 PROCEDURE — 87205 SMEAR GRAM STAIN: CPT | Mod: AHULAB | Performed by: STUDENT IN AN ORGANIZED HEALTH CARE EDUCATION/TRAINING PROGRAM

## 2025-07-01 PROCEDURE — 38900 IO MAP OF SENT LYMPH NODE: CPT | Performed by: STUDENT IN AN ORGANIZED HEALTH CARE EDUCATION/TRAINING PROGRAM

## 2025-07-01 PROCEDURE — 58571 TLH W/T/O 250 G OR LESS: CPT | Performed by: STUDENT IN AN ORGANIZED HEALTH CARE EDUCATION/TRAINING PROGRAM

## 2025-07-01 PROCEDURE — 3600000018 HC OR TIME - INITIAL BASE CHARGE - PROCEDURE LEVEL SIX: Performed by: STUDENT IN AN ORGANIZED HEALTH CARE EDUCATION/TRAINING PROGRAM

## 2025-07-01 PROCEDURE — 2500000004 HC RX 250 GENERAL PHARMACY W/ HCPCS (ALT 636 FOR OP/ED): Mod: JZ | Performed by: STUDENT IN AN ORGANIZED HEALTH CARE EDUCATION/TRAINING PROGRAM

## 2025-07-01 PROCEDURE — C1889 IMPLANT/INSERT DEVICE, NOC: HCPCS | Performed by: STUDENT IN AN ORGANIZED HEALTH CARE EDUCATION/TRAINING PROGRAM

## 2025-07-01 PROCEDURE — 36415 COLL VENOUS BLD VENIPUNCTURE: CPT | Performed by: STUDENT IN AN ORGANIZED HEALTH CARE EDUCATION/TRAINING PROGRAM

## 2025-07-01 PROCEDURE — 7100000001 HC RECOVERY ROOM TIME - INITIAL BASE CHARGE: Performed by: STUDENT IN AN ORGANIZED HEALTH CARE EDUCATION/TRAINING PROGRAM

## 2025-07-01 PROCEDURE — 81479 UNLISTED MOLECULAR PATHOLOGY: CPT | Performed by: STUDENT IN AN ORGANIZED HEALTH CARE EDUCATION/TRAINING PROGRAM

## 2025-07-01 RX ORDER — LABETALOL HYDROCHLORIDE 5 MG/ML
5 INJECTION, SOLUTION INTRAVENOUS ONCE AS NEEDED
Status: DISCONTINUED | OUTPATIENT
Start: 2025-07-01 | End: 2025-07-01 | Stop reason: HOSPADM

## 2025-07-01 RX ORDER — HEPARIN SODIUM 5000 [USP'U]/ML
5000 INJECTION, SOLUTION INTRAVENOUS; SUBCUTANEOUS ONCE
Status: COMPLETED | OUTPATIENT
Start: 2025-07-01 | End: 2025-07-01

## 2025-07-01 RX ORDER — DEXTROSE 50 % IN WATER (D50W) INTRAVENOUS SYRINGE
25
Status: DISCONTINUED | OUTPATIENT
Start: 2025-07-01 | End: 2025-07-02 | Stop reason: HOSPADM

## 2025-07-01 RX ORDER — GABAPENTIN 300 MG/1
600 CAPSULE ORAL ONCE
Status: COMPLETED | OUTPATIENT
Start: 2025-07-01 | End: 2025-07-01

## 2025-07-01 RX ORDER — INDOCYANINE GREEN AND WATER 25 MG
KIT INJECTION AS NEEDED
Status: DISCONTINUED | OUTPATIENT
Start: 2025-07-01 | End: 2025-07-01 | Stop reason: HOSPADM

## 2025-07-01 RX ORDER — TRAMADOL HYDROCHLORIDE 50 MG/1
50 TABLET, FILM COATED ORAL 3 TIMES DAILY PRN
Status: DISCONTINUED | OUTPATIENT
Start: 2025-07-01 | End: 2025-07-02 | Stop reason: HOSPADM

## 2025-07-01 RX ORDER — METRONIDAZOLE 500 MG/100ML
INJECTION, SOLUTION INTRAVENOUS AS NEEDED
Status: DISCONTINUED | OUTPATIENT
Start: 2025-07-01 | End: 2025-07-01

## 2025-07-01 RX ORDER — ATORVASTATIN CALCIUM 40 MG/1
40 TABLET, FILM COATED ORAL DAILY
Status: DISCONTINUED | OUTPATIENT
Start: 2025-07-02 | End: 2025-07-02 | Stop reason: HOSPADM

## 2025-07-01 RX ORDER — OXYCODONE HYDROCHLORIDE 5 MG/1
5 TABLET ORAL EVERY 4 HOURS PRN
Status: DISCONTINUED | OUTPATIENT
Start: 2025-07-01 | End: 2025-07-01 | Stop reason: HOSPADM

## 2025-07-01 RX ORDER — CLONIDINE HYDROCHLORIDE 0.2 MG/1
0.2 TABLET ORAL EVERY 8 HOURS SCHEDULED
Status: DISCONTINUED | OUTPATIENT
Start: 2025-07-01 | End: 2025-07-02 | Stop reason: HOSPADM

## 2025-07-01 RX ORDER — LOSARTAN POTASSIUM 50 MG/1
100 TABLET ORAL DAILY
Status: DISCONTINUED | OUTPATIENT
Start: 2025-07-01 | End: 2025-07-02 | Stop reason: HOSPADM

## 2025-07-01 RX ORDER — INSULIN LISPRO 100 [IU]/ML
0-15 INJECTION, SOLUTION INTRAVENOUS; SUBCUTANEOUS
Status: DISCONTINUED | OUTPATIENT
Start: 2025-07-01 | End: 2025-07-02 | Stop reason: HOSPADM

## 2025-07-01 RX ORDER — NALOXONE HYDROCHLORIDE 0.4 MG/ML
0.1 INJECTION, SOLUTION INTRAMUSCULAR; INTRAVENOUS; SUBCUTANEOUS EVERY 5 MIN PRN
Status: DISCONTINUED | OUTPATIENT
Start: 2025-07-01 | End: 2025-07-02 | Stop reason: HOSPADM

## 2025-07-01 RX ORDER — FUROSEMIDE 40 MG/1
40 TABLET ORAL DAILY
Status: DISCONTINUED | OUTPATIENT
Start: 2025-07-02 | End: 2025-07-02 | Stop reason: HOSPADM

## 2025-07-01 RX ORDER — ONDANSETRON HYDROCHLORIDE 2 MG/ML
INJECTION, SOLUTION INTRAVENOUS AS NEEDED
Status: DISCONTINUED | OUTPATIENT
Start: 2025-07-01 | End: 2025-07-01

## 2025-07-01 RX ORDER — HYDROMORPHONE HYDROCHLORIDE 1 MG/ML
INJECTION, SOLUTION INTRAMUSCULAR; INTRAVENOUS; SUBCUTANEOUS AS NEEDED
Status: DISCONTINUED | OUTPATIENT
Start: 2025-07-01 | End: 2025-07-01

## 2025-07-01 RX ORDER — LIDOCAINE HYDROCHLORIDE 10 MG/ML
0.1 INJECTION, SOLUTION EPIDURAL; INFILTRATION; INTRACAUDAL; PERINEURAL ONCE
Status: DISCONTINUED | OUTPATIENT
Start: 2025-07-01 | End: 2025-07-01 | Stop reason: HOSPADM

## 2025-07-01 RX ORDER — ROCURONIUM BROMIDE 10 MG/ML
INJECTION, SOLUTION INTRAVENOUS AS NEEDED
Status: DISCONTINUED | OUTPATIENT
Start: 2025-07-01 | End: 2025-07-01

## 2025-07-01 RX ORDER — INSULIN GLARGINE 100 [IU]/ML
20 INJECTION, SOLUTION SUBCUTANEOUS NIGHTLY
Status: DISCONTINUED | OUTPATIENT
Start: 2025-07-01 | End: 2025-07-02 | Stop reason: HOSPADM

## 2025-07-01 RX ORDER — MIDAZOLAM HYDROCHLORIDE 1 MG/ML
INJECTION INTRAMUSCULAR; INTRAVENOUS AS NEEDED
Status: DISCONTINUED | OUTPATIENT
Start: 2025-07-01 | End: 2025-07-01

## 2025-07-01 RX ORDER — METOPROLOL SUCCINATE 50 MG/1
50 TABLET, EXTENDED RELEASE ORAL 2 TIMES DAILY
Status: DISCONTINUED | OUTPATIENT
Start: 2025-07-01 | End: 2025-07-02 | Stop reason: HOSPADM

## 2025-07-01 RX ORDER — ACETAMINOPHEN 325 MG/1
975 TABLET ORAL EVERY 6 HOURS
Status: DISCONTINUED | OUTPATIENT
Start: 2025-07-01 | End: 2025-07-02 | Stop reason: HOSPADM

## 2025-07-01 RX ORDER — WATER 1 ML/ML
INJECTION IRRIGATION AS NEEDED
Status: DISCONTINUED | OUTPATIENT
Start: 2025-07-01 | End: 2025-07-01 | Stop reason: HOSPADM

## 2025-07-01 RX ORDER — PANTOPRAZOLE SODIUM 40 MG/1
40 TABLET, DELAYED RELEASE ORAL
Status: DISCONTINUED | OUTPATIENT
Start: 2025-07-02 | End: 2025-07-02 | Stop reason: HOSPADM

## 2025-07-01 RX ORDER — SODIUM CHLORIDE 0.9 G/100ML
INJECTION, SOLUTION IRRIGATION AS NEEDED
Status: DISCONTINUED | OUTPATIENT
Start: 2025-07-01 | End: 2025-07-01 | Stop reason: HOSPADM

## 2025-07-01 RX ORDER — ACETAMINOPHEN 325 MG/1
975 TABLET ORAL ONCE
Status: COMPLETED | OUTPATIENT
Start: 2025-07-01 | End: 2025-07-01

## 2025-07-01 RX ORDER — DEXTROSE 50 % IN WATER (D50W) INTRAVENOUS SYRINGE
12.5
Status: DISCONTINUED | OUTPATIENT
Start: 2025-07-01 | End: 2025-07-02 | Stop reason: HOSPADM

## 2025-07-01 RX ORDER — LIDOCAINE HYDROCHLORIDE 20 MG/ML
INJECTION, SOLUTION EPIDURAL; INFILTRATION; INTRACAUDAL; PERINEURAL AS NEEDED
Status: DISCONTINUED | OUTPATIENT
Start: 2025-07-01 | End: 2025-07-01

## 2025-07-01 RX ORDER — CELECOXIB 200 MG/1
400 CAPSULE ORAL ONCE
Status: COMPLETED | OUTPATIENT
Start: 2025-07-01 | End: 2025-07-01

## 2025-07-01 RX ORDER — POLYETHYLENE GLYCOL 3350 17 G/17G
17 POWDER, FOR SOLUTION ORAL DAILY
Status: DISCONTINUED | OUTPATIENT
Start: 2025-07-01 | End: 2025-07-02 | Stop reason: HOSPADM

## 2025-07-01 RX ORDER — PROPOFOL 10 MG/ML
INJECTION, EMULSION INTRAVENOUS AS NEEDED
Status: DISCONTINUED | OUTPATIENT
Start: 2025-07-01 | End: 2025-07-01

## 2025-07-01 RX ORDER — CEFAZOLIN 1 G/1
INJECTION, POWDER, FOR SOLUTION INTRAVENOUS AS NEEDED
Status: DISCONTINUED | OUTPATIENT
Start: 2025-07-01 | End: 2025-07-01

## 2025-07-01 RX ORDER — ONDANSETRON HYDROCHLORIDE 2 MG/ML
4 INJECTION, SOLUTION INTRAVENOUS EVERY 6 HOURS PRN
Status: DISCONTINUED | OUTPATIENT
Start: 2025-07-01 | End: 2025-07-02 | Stop reason: HOSPADM

## 2025-07-01 RX ORDER — DIPHENHYDRAMINE HYDROCHLORIDE 50 MG/ML
12.5 INJECTION, SOLUTION INTRAMUSCULAR; INTRAVENOUS ONCE AS NEEDED
Status: DISCONTINUED | OUTPATIENT
Start: 2025-07-01 | End: 2025-07-01 | Stop reason: HOSPADM

## 2025-07-01 RX ORDER — HEPARIN SODIUM 5000 [USP'U]/ML
7500 INJECTION, SOLUTION INTRAVENOUS; SUBCUTANEOUS EVERY 8 HOURS SCHEDULED
Status: DISCONTINUED | OUTPATIENT
Start: 2025-07-01 | End: 2025-07-02 | Stop reason: HOSPADM

## 2025-07-01 RX ORDER — SODIUM CHLORIDE, SODIUM LACTATE, POTASSIUM CHLORIDE, CALCIUM CHLORIDE 600; 310; 30; 20 MG/100ML; MG/100ML; MG/100ML; MG/100ML
100 INJECTION, SOLUTION INTRAVENOUS CONTINUOUS
Status: DISCONTINUED | OUTPATIENT
Start: 2025-07-01 | End: 2025-07-01 | Stop reason: HOSPADM

## 2025-07-01 RX ORDER — SIMETHICONE 80 MG
80 TABLET,CHEWABLE ORAL 4 TIMES DAILY PRN
Status: DISCONTINUED | OUTPATIENT
Start: 2025-07-01 | End: 2025-07-02 | Stop reason: HOSPADM

## 2025-07-01 RX ORDER — FENTANYL CITRATE 50 UG/ML
INJECTION, SOLUTION INTRAMUSCULAR; INTRAVENOUS AS NEEDED
Status: DISCONTINUED | OUTPATIENT
Start: 2025-07-01 | End: 2025-07-01

## 2025-07-01 RX ORDER — ONDANSETRON HYDROCHLORIDE 2 MG/ML
4 INJECTION, SOLUTION INTRAVENOUS ONCE AS NEEDED
Status: COMPLETED | OUTPATIENT
Start: 2025-07-01 | End: 2025-07-01

## 2025-07-01 RX ORDER — ONDANSETRON 4 MG/1
4 TABLET, ORALLY DISINTEGRATING ORAL EVERY 6 HOURS PRN
Status: DISCONTINUED | OUTPATIENT
Start: 2025-07-01 | End: 2025-07-02 | Stop reason: HOSPADM

## 2025-07-01 RX ADMIN — ROCURONIUM BROMIDE 20 MG: 10 INJECTION, SOLUTION INTRAVENOUS at 10:45

## 2025-07-01 RX ADMIN — MIDAZOLAM HYDROCHLORIDE 2 MG: 1 INJECTION, SOLUTION INTRAMUSCULAR; INTRAVENOUS at 08:28

## 2025-07-01 RX ADMIN — CEFAZOLIN 2 G: 1 INJECTION, POWDER, FOR SOLUTION INTRAMUSCULAR; INTRAVENOUS at 08:43

## 2025-07-01 RX ADMIN — SODIUM CHLORIDE, POTASSIUM CHLORIDE, SODIUM LACTATE AND CALCIUM CHLORIDE: 600; 310; 30; 20 INJECTION, SOLUTION INTRAVENOUS at 08:15

## 2025-07-01 RX ADMIN — SUGAMMADEX 200 MG: 100 INJECTION, SOLUTION INTRAVENOUS at 11:38

## 2025-07-01 RX ADMIN — FENTANYL CITRATE 50 MCG: 50 INJECTION, SOLUTION INTRAMUSCULAR; INTRAVENOUS at 08:37

## 2025-07-01 RX ADMIN — HEPARIN SODIUM 5000 UNITS: 5000 INJECTION, SOLUTION INTRAVENOUS; SUBCUTANEOUS at 07:49

## 2025-07-01 RX ADMIN — CLONIDINE HYDROCHLORIDE 0.2 MG: 0.2 TABLET ORAL at 16:46

## 2025-07-01 RX ADMIN — HYDROMORPHONE HYDROCHLORIDE 0.3 MG: 1 INJECTION, SOLUTION INTRAMUSCULAR; INTRAVENOUS; SUBCUTANEOUS at 11:37

## 2025-07-01 RX ADMIN — ONDANSETRON 4 MG: 2 INJECTION, SOLUTION INTRAMUSCULAR; INTRAVENOUS at 14:45

## 2025-07-01 RX ADMIN — LIDOCAINE HYDROCHLORIDE 100 MG: 20 INJECTION, SOLUTION EPIDURAL; INFILTRATION; INTRACAUDAL; PERINEURAL at 08:37

## 2025-07-01 RX ADMIN — PROPOFOL 15 MCG/KG/MIN: 10 INJECTION, EMULSION INTRAVENOUS at 08:45

## 2025-07-01 RX ADMIN — HYDROMORPHONE HYDROCHLORIDE 0.2 MG: 1 INJECTION, SOLUTION INTRAMUSCULAR; INTRAVENOUS; SUBCUTANEOUS at 11:26

## 2025-07-01 RX ADMIN — INSULIN GLARGINE 20 UNITS: 100 INJECTION, SOLUTION SUBCUTANEOUS at 22:42

## 2025-07-01 RX ADMIN — ROCURONIUM BROMIDE 20 MG: 10 INJECTION, SOLUTION INTRAVENOUS at 09:12

## 2025-07-01 RX ADMIN — CELECOXIB 400 MG: 200 CAPSULE ORAL at 07:49

## 2025-07-01 RX ADMIN — METRONIDAZOLE 500 MG: 500 INJECTION, SOLUTION INTRAVENOUS at 08:45

## 2025-07-01 RX ADMIN — ROCURONIUM BROMIDE 50 MG: 10 INJECTION, SOLUTION INTRAVENOUS at 08:37

## 2025-07-01 RX ADMIN — METOPROLOL SUCCINATE 50 MG: 50 TABLET, EXTENDED RELEASE ORAL at 22:42

## 2025-07-01 RX ADMIN — SODIUM CHLORIDE, POTASSIUM CHLORIDE, SODIUM LACTATE AND CALCIUM CHLORIDE: 600; 310; 30; 20 INJECTION, SOLUTION INTRAVENOUS at 11:16

## 2025-07-01 RX ADMIN — CLONIDINE HYDROCHLORIDE 0.2 MG: 0.2 TABLET ORAL at 22:42

## 2025-07-01 RX ADMIN — HYDROMORPHONE HYDROCHLORIDE 0.2 MG: 1 INJECTION, SOLUTION INTRAMUSCULAR; INTRAVENOUS; SUBCUTANEOUS at 11:34

## 2025-07-01 RX ADMIN — ACETAMINOPHEN 975 MG: 325 TABLET ORAL at 22:42

## 2025-07-01 RX ADMIN — LOSARTAN POTASSIUM 100 MG: 50 TABLET, FILM COATED ORAL at 16:46

## 2025-07-01 RX ADMIN — INSULIN LISPRO 6 UNITS: 100 INJECTION, SOLUTION INTRAVENOUS; SUBCUTANEOUS at 16:56

## 2025-07-01 RX ADMIN — Medication 4 L/MIN: at 11:58

## 2025-07-01 RX ADMIN — HEPARIN SODIUM 7500 UNITS: 5000 INJECTION, SOLUTION INTRAVENOUS; SUBCUTANEOUS at 22:42

## 2025-07-01 RX ADMIN — HYDROMORPHONE HYDROCHLORIDE 0.5 MG: 1 INJECTION, SOLUTION INTRAMUSCULAR; INTRAVENOUS; SUBCUTANEOUS at 12:46

## 2025-07-01 RX ADMIN — ONDANSETRON 4 MG: 2 INJECTION, SOLUTION INTRAMUSCULAR; INTRAVENOUS at 11:25

## 2025-07-01 RX ADMIN — HEPARIN SODIUM 7500 UNITS: 5000 INJECTION, SOLUTION INTRAVENOUS; SUBCUTANEOUS at 16:46

## 2025-07-01 RX ADMIN — HYDROMORPHONE HYDROCHLORIDE 0.3 MG: 1 INJECTION, SOLUTION INTRAMUSCULAR; INTRAVENOUS; SUBCUTANEOUS at 11:05

## 2025-07-01 RX ADMIN — Medication 4 L/MIN: at 12:30

## 2025-07-01 RX ADMIN — CARBOXYMETHYLCELLULOSE SODIUM 2 DROP: 5 SOLUTION/ DROPS OPHTHALMIC at 08:39

## 2025-07-01 RX ADMIN — ROCURONIUM BROMIDE 20 MG: 10 INJECTION, SOLUTION INTRAVENOUS at 10:09

## 2025-07-01 RX ADMIN — POLYETHYLENE GLYCOL 3350 17 G: 17 POWDER, FOR SOLUTION ORAL at 16:46

## 2025-07-01 RX ADMIN — HYDROMORPHONE HYDROCHLORIDE 0.5 MG: 1 INJECTION, SOLUTION INTRAMUSCULAR; INTRAVENOUS; SUBCUTANEOUS at 13:08

## 2025-07-01 RX ADMIN — GABAPENTIN 600 MG: 300 CAPSULE ORAL at 07:49

## 2025-07-01 RX ADMIN — ACETAMINOPHEN 975 MG: 325 TABLET ORAL at 07:49

## 2025-07-01 RX ADMIN — FENTANYL CITRATE 50 MCG: 50 INJECTION, SOLUTION INTRAMUSCULAR; INTRAVENOUS at 09:10

## 2025-07-01 RX ADMIN — PROPOFOL 200 MG: 10 INJECTION, EMULSION INTRAVENOUS at 08:37

## 2025-07-01 RX ADMIN — ACETAMINOPHEN 975 MG: 325 TABLET ORAL at 16:46

## 2025-07-01 RX ADMIN — HYDROMORPHONE HYDROCHLORIDE 0.5 MG: 1 INJECTION, SOLUTION INTRAMUSCULAR; INTRAVENOUS; SUBCUTANEOUS at 12:36

## 2025-07-01 SDOH — ECONOMIC STABILITY: INCOME INSECURITY
IN THE PAST 12 MONTHS HAS THE ELECTRIC, GAS, OIL, OR WATER COMPANY THREATENED TO SHUT OFF SERVICES IN YOUR HOME?: PATIENT DECLINED

## 2025-07-01 SDOH — ECONOMIC STABILITY: FOOD INSECURITY
WITHIN THE PAST 12 MONTHS, YOU WORRIED THAT YOUR FOOD WOULD RUN OUT BEFORE YOU GOT THE MONEY TO BUY MORE.: PATIENT DECLINED

## 2025-07-01 SDOH — SOCIAL STABILITY: SOCIAL INSECURITY: ABUSE: ADULT

## 2025-07-01 SDOH — ECONOMIC STABILITY: HOUSING INSECURITY: IN THE LAST 12 MONTHS, WAS THERE A TIME WHEN YOU WERE NOT ABLE TO PAY THE MORTGAGE OR RENT ON TIME?: PATIENT DECLINED

## 2025-07-01 SDOH — SOCIAL STABILITY: SOCIAL INSECURITY
WITHIN THE LAST YEAR, HAVE YOU BEEN KICKED, HIT, SLAPPED, OR OTHERWISE PHYSICALLY HURT BY YOUR PARTNER OR EX-PARTNER?: PATIENT DECLINED

## 2025-07-01 SDOH — SOCIAL STABILITY: SOCIAL INSECURITY: HAVE YOU HAD THOUGHTS OF HARMING ANYONE ELSE?: NO

## 2025-07-01 SDOH — SOCIAL STABILITY: SOCIAL INSECURITY: DOES ANYONE TRY TO KEEP YOU FROM HAVING/CONTACTING OTHER FRIENDS OR DOING THINGS OUTSIDE YOUR HOME?: NO

## 2025-07-01 SDOH — ECONOMIC STABILITY: HOUSING INSECURITY: AT ANY TIME IN THE PAST 12 MONTHS, WERE YOU HOMELESS OR LIVING IN A SHELTER (INCLUDING NOW)?: PATIENT DECLINED

## 2025-07-01 SDOH — ECONOMIC STABILITY: FOOD INSECURITY: WITHIN THE PAST 12 MONTHS, THE FOOD YOU BOUGHT JUST DIDN'T LAST AND YOU DIDN'T HAVE MONEY TO GET MORE.: PATIENT DECLINED

## 2025-07-01 SDOH — ECONOMIC STABILITY: TRANSPORTATION INSECURITY
IN THE PAST 12 MONTHS, HAS LACK OF TRANSPORTATION KEPT YOU FROM MEDICAL APPOINTMENTS OR FROM GETTING MEDICATIONS?: PATIENT DECLINED

## 2025-07-01 SDOH — SOCIAL STABILITY: SOCIAL INSECURITY: DO YOU FEEL ANYONE HAS EXPLOITED OR TAKEN ADVANTAGE OF YOU FINANCIALLY OR OF YOUR PERSONAL PROPERTY?: NO

## 2025-07-01 SDOH — SOCIAL STABILITY: SOCIAL INSECURITY: WERE YOU ABLE TO COMPLETE ALL THE BEHAVIORAL HEALTH SCREENINGS?: YES

## 2025-07-01 SDOH — SOCIAL STABILITY: SOCIAL INSECURITY: HAS ANYONE EVER THREATENED TO HURT YOUR FAMILY OR YOUR PETS?: NO

## 2025-07-01 SDOH — SOCIAL STABILITY: SOCIAL INSECURITY: DO YOU FEEL UNSAFE GOING BACK TO THE PLACE WHERE YOU ARE LIVING?: NO

## 2025-07-01 SDOH — ECONOMIC STABILITY: HOUSING INSECURITY: IN THE PAST 12 MONTHS, HOW MANY TIMES HAVE YOU MOVED WHERE YOU WERE LIVING?: 1

## 2025-07-01 SDOH — SOCIAL STABILITY: SOCIAL INSECURITY: WITHIN THE LAST YEAR, HAVE YOU BEEN AFRAID OF YOUR PARTNER OR EX-PARTNER?: PATIENT DECLINED

## 2025-07-01 SDOH — SOCIAL STABILITY: SOCIAL INSECURITY: ARE THERE ANY APPARENT SIGNS OF INJURIES/BEHAVIORS THAT COULD BE RELATED TO ABUSE/NEGLECT?: NO

## 2025-07-01 SDOH — ECONOMIC STABILITY: FOOD INSECURITY: HOW HARD IS IT FOR YOU TO PAY FOR THE VERY BASICS LIKE FOOD, HOUSING, MEDICAL CARE, AND HEATING?: PATIENT DECLINED

## 2025-07-01 SDOH — SOCIAL STABILITY: SOCIAL INSECURITY
WITHIN THE LAST YEAR, HAVE YOU BEEN HUMILIATED OR EMOTIONALLY ABUSED IN OTHER WAYS BY YOUR PARTNER OR EX-PARTNER?: PATIENT DECLINED

## 2025-07-01 SDOH — SOCIAL STABILITY: SOCIAL INSECURITY
WITHIN THE LAST YEAR, HAVE YOU BEEN RAPED OR FORCED TO HAVE ANY KIND OF SEXUAL ACTIVITY BY YOUR PARTNER OR EX-PARTNER?: PATIENT DECLINED

## 2025-07-01 SDOH — SOCIAL STABILITY: SOCIAL INSECURITY: ARE YOU OR HAVE YOU BEEN THREATENED OR ABUSED PHYSICALLY, EMOTIONALLY, OR SEXUALLY BY ANYONE?: NO

## 2025-07-01 ASSESSMENT — COGNITIVE AND FUNCTIONAL STATUS - GENERAL
CLIMB 3 TO 5 STEPS WITH RAILING: A LITTLE
DRESSING REGULAR LOWER BODY CLOTHING: A LITTLE
DRESSING REGULAR LOWER BODY CLOTHING: A LITTLE
DRESSING REGULAR UPPER BODY CLOTHING: A LITTLE
WALKING IN HOSPITAL ROOM: A LITTLE
MOVING TO AND FROM BED TO CHAIR: A LITTLE
MOVING TO AND FROM BED TO CHAIR: A LITTLE
WALKING IN HOSPITAL ROOM: A LITTLE
TOILETING: A LITTLE
PERSONAL GROOMING: A LITTLE
TURNING FROM BACK TO SIDE WHILE IN FLAT BAD: A LITTLE
MOBILITY SCORE: 18
MOVING FROM LYING ON BACK TO SITTING ON SIDE OF FLAT BED WITH BEDRAILS: A LITTLE
PERSONAL GROOMING: A LITTLE
MOVING FROM LYING ON BACK TO SITTING ON SIDE OF FLAT BED WITH BEDRAILS: A LITTLE
MOBILITY SCORE: 18
TURNING FROM BACK TO SIDE WHILE IN FLAT BAD: A LITTLE
TURNING FROM BACK TO SIDE WHILE IN FLAT BAD: A LITTLE
STANDING UP FROM CHAIR USING ARMS: A LITTLE
DAILY ACTIVITIY SCORE: 19
MOVING FROM LYING ON BACK TO SITTING ON SIDE OF FLAT BED WITH BEDRAILS: A LITTLE
CLIMB 3 TO 5 STEPS WITH RAILING: A LITTLE
DAILY ACTIVITIY SCORE: 19
PERSONAL GROOMING: A LITTLE
DRESSING REGULAR UPPER BODY CLOTHING: A LITTLE
TOILETING: A LITTLE
HELP NEEDED FOR BATHING: A LITTLE
HELP NEEDED FOR BATHING: A LITTLE
DRESSING REGULAR UPPER BODY CLOTHING: A LITTLE
STANDING UP FROM CHAIR USING ARMS: A LITTLE
DRESSING REGULAR LOWER BODY CLOTHING: A LITTLE
WALKING IN HOSPITAL ROOM: A LITTLE
TOILETING: A LITTLE
HELP NEEDED FOR BATHING: A LITTLE
STANDING UP FROM CHAIR USING ARMS: A LITTLE
MOVING TO AND FROM BED TO CHAIR: A LITTLE
MOBILITY SCORE: 18
DAILY ACTIVITIY SCORE: 19
CLIMB 3 TO 5 STEPS WITH RAILING: A LITTLE

## 2025-07-01 ASSESSMENT — PAIN - FUNCTIONAL ASSESSMENT
PAIN_FUNCTIONAL_ASSESSMENT: 0-10
PAIN_FUNCTIONAL_ASSESSMENT: UNABLE TO SELF-REPORT
PAIN_FUNCTIONAL_ASSESSMENT: UNABLE TO SELF-REPORT
PAIN_FUNCTIONAL_ASSESSMENT: 0-10
PAIN_FUNCTIONAL_ASSESSMENT: 0-10
PAIN_FUNCTIONAL_ASSESSMENT: UNABLE TO SELF-REPORT
PAIN_FUNCTIONAL_ASSESSMENT: 0-10
PAIN_FUNCTIONAL_ASSESSMENT: UNABLE TO SELF-REPORT
PAIN_FUNCTIONAL_ASSESSMENT: 0-10
PAIN_FUNCTIONAL_ASSESSMENT: 0-10
PAIN_FUNCTIONAL_ASSESSMENT: UNABLE TO SELF-REPORT
PAIN_FUNCTIONAL_ASSESSMENT: 0-10
PAIN_FUNCTIONAL_ASSESSMENT: 0-10
PAIN_FUNCTIONAL_ASSESSMENT: UNABLE TO SELF-REPORT
PAIN_FUNCTIONAL_ASSESSMENT: UNABLE TO SELF-REPORT
PAIN_FUNCTIONAL_ASSESSMENT: 0-10
PAIN_FUNCTIONAL_ASSESSMENT: UNABLE TO SELF-REPORT
PAIN_FUNCTIONAL_ASSESSMENT: 0-10

## 2025-07-01 ASSESSMENT — COLUMBIA-SUICIDE SEVERITY RATING SCALE - C-SSRS
2. HAVE YOU ACTUALLY HAD ANY THOUGHTS OF KILLING YOURSELF?: NO
1. IN THE PAST MONTH, HAVE YOU WISHED YOU WERE DEAD OR WISHED YOU COULD GO TO SLEEP AND NOT WAKE UP?: NO
6. HAVE YOU EVER DONE ANYTHING, STARTED TO DO ANYTHING, OR PREPARED TO DO ANYTHING TO END YOUR LIFE?: NO

## 2025-07-01 ASSESSMENT — PAIN SCALES - GENERAL
PAINLEVEL_OUTOF10: 0 - NO PAIN
PAINLEVEL_OUTOF10: 7
PAINLEVEL_OUTOF10: 5 - MODERATE PAIN
PAINLEVEL_OUTOF10: 0 - NO PAIN
PAINLEVEL_OUTOF10: 9
PAINLEVEL_OUTOF10: 7
PAINLEVEL_OUTOF10: 8
PAINLEVEL_OUTOF10: 7

## 2025-07-01 ASSESSMENT — LIFESTYLE VARIABLES
HOW OFTEN DO YOU HAVE 6 OR MORE DRINKS ON ONE OCCASION: NEVER
AUDIT-C TOTAL SCORE: 0
SKIP TO QUESTIONS 9-10: 1
HOW MANY STANDARD DRINKS CONTAINING ALCOHOL DO YOU HAVE ON A TYPICAL DAY: PATIENT DOES NOT DRINK
HOW OFTEN DO YOU HAVE A DRINK CONTAINING ALCOHOL: NEVER
AUDIT-C TOTAL SCORE: 0

## 2025-07-01 ASSESSMENT — PAIN DESCRIPTION - LOCATION
LOCATION: ABDOMEN

## 2025-07-01 ASSESSMENT — ACTIVITIES OF DAILY LIVING (ADL)
PATIENT'S MEMORY ADEQUATE TO SAFELY COMPLETE DAILY ACTIVITIES?: YES
FEEDING YOURSELF: INDEPENDENT
TOILETING: INDEPENDENT
WALKS IN HOME: INDEPENDENT
LACK_OF_TRANSPORTATION: PATIENT DECLINED
HEARING - LEFT EAR: FUNCTIONAL
JUDGMENT_ADEQUATE_SAFELY_COMPLETE_DAILY_ACTIVITIES: YES
GROOMING: INDEPENDENT
LACK_OF_TRANSPORTATION: PATIENT DECLINED
BATHING: INDEPENDENT
ADEQUATE_TO_COMPLETE_ADL: YES
DRESSING YOURSELF: INDEPENDENT
HEARING - RIGHT EAR: FUNCTIONAL

## 2025-07-01 ASSESSMENT — PATIENT HEALTH QUESTIONNAIRE - PHQ9
1. LITTLE INTEREST OR PLEASURE IN DOING THINGS: NOT AT ALL
SUM OF ALL RESPONSES TO PHQ9 QUESTIONS 1 & 2: 0
2. FEELING DOWN, DEPRESSED OR HOPELESS: NOT AT ALL

## 2025-07-01 NOTE — OP NOTE
Robotic Laparoscopic Total Hysterectomy; Bilateral Salpingo Oophorectomy; [place] Lymph Node Biopsies, lysis of adhesions, left ureteral lysis (B) Operative Note     Date: 2025  OR Location: [facility] A OR    Name: Kathy Lara, : 1960, Age: 64 y.o., MRN: [id number], Sex: female    Diagnosis  Pre-op Diagnosis      * Endometrial cancer (Multi) [C54.1] Post-op Diagnosis     * Endometrial cancer (Multi) [C54.1]     Procedures  Robotic Laparoscopic Total Hysterectomy; Bilateral Salpingo Oophorectomy; [place] Lymph Node Biopsies, lysis of adhesions, left ureteral lysis  01731 - ND LAPS TOTAL HYSTERECT 250 GM/< W/RMVL TUBE/OVARY    Robotic Laparoscopic Total Hysterectomy; Bilateral Salpingo Oophorectomy; [place] Lymph Node Biopsies, lysis of adhesions, left ureteral lysis  [id number] - ND LAP,LYMPH NODE BX    Surgeons      * Nga Nur - Primary    Resident/Fellow/Other Assistant:  Surgeons and Role:  * No surgeons found with a matching role *  Ryan Cassidy MD    Staff:   Circulator: Emely Ramirez Person: Alexandrea  Relief Scrub: Fernanda  Relief Circulator: Cathy    Anesthesia Staff: Anesthesiologist: Jim Graff MD  CRNA: KIMBERLY Pike-RICH  Frontline Breaker: FRANTZ Diaz    Procedure Summary  Anesthesia: Anesthesia type not filed in the log.  ASA: III  Estimated Blood Loss: 50mL  Intra-op Medications:   Administrations occurring from 0805 to 1125 on 25:   Medication Name Total Dose   sodium chloride 0.9 % irrigation solution 1,000 mL   sterile water irrigation solution 500 mL   ceFAZolin (Ancef) vial 1 g 2 g   fentaNYL (Sublimaze) injection 50 mcg/mL 100 mcg   HYDROmorphone (Dilaudid) injection 1 mg/mL 0.3 mg   LR bolus Cannot be calculated   lidocaine PF (Xylocaine-MPF) local injection 2 % 100 mg   lubricating eye drops ophthalmic solution 2 drop   metroNIDAZOLE (Flagyl)  mg in 100 mL NaCl (iso) - premix 500 mg   midazolam PF (Versed) injection 1 mg/mL 2 mg   ondansetron  (Zofran) 2 mg/mL injection 4 mg   propofol (Diprivan) injection 10 mg/mL 580.73 mg   rocuronium (ZeMuron) 50 mg/5 mL injection 110 mg              Anesthesia Record               Intraprocedure I/O Totals          Intake    LR bolus 1000.00 mL    Total Intake 1000 mL       Output    Urine 250 mL    Est. Blood Loss 50 mL    Total Output 300 mL       Net    Net Volume 700 mL          Specimen:   ID Type Source Tests Collected by Time   1 : RIGHT OBTURATOR SENTINEL LYMPH NODE Tissue SENTINEL LYMPH NODE, PELVIC SURGICAL PATHOLOGY EXAM Nga Nur MD 7/1/2025 1012   2 : LEFT EXTERNAL ILIAC SENTINEL LYMPH NODE Tissue SENTINEL LYMPH NODE, PELVIC SURGICAL PATHOLOGY EXAM Nga Nur MD 7/1/2025 1013   3 : UTERUS, CERVIX, BILATERAL FALLOPIAN TUBES AND OVARIES Tissue UTERUS, CERVIX, FALLOPIAN TUBES AND OVARIES BILATERAL SURGICAL PATHOLOGY EXAM Nga Nur MD 7/1/2025 1017   A : PELVIC WASHINGS Fluid PELVIC WASHING STERILE FLUID CULTURE/SMEAR Nga Nur MD 7/1/2025 0933         Drains and/or Catheters:   [REMOVED] Urethral Catheter Non-latex 16 Fr. (Removed)     Findings: Normal appearing uterus, cervix; bilateral fallopian tubes and ovaries without grossly visible pathology. Moderate dense fibrotic anterior abdominal wall adhesions with omentum; no grossly visible hernia. Fibrinous adhesions in pelvis. Otherwise smooth uterine serosa and pelvic peritoneal structures. Grossly normal small and large bowel mesenteric and peritoneal surfaces, omentum. Smooth liver contour without visibly evident upper abdominal pathology. Normal appearing gallbladder and surgically absent appendix with R pelvic sidewall adhesions secondary to prior ruptured appendicitis.     Indications: Kathy Lara is an 64 y.o. female who is having surgery for Endometrial cancer (Multi) [C54.1].     The patient was seen in the preoperative area. The risks, benefits, complications, treatment options, non-operative alternatives, expected recovery and  outcomes were discussed with the patient. The possibilities of reaction to medication, pulmonary aspiration, injury to surrounding structures, bleeding, recurrent infection, the need for additional procedures, failure to diagnose a condition, and creating a complication requiring transfusion or operation were discussed with the patient. The patient concurred with the proposed plan, giving informed consent.  The site of surgery was properly noted/marked if necessary per policy. The patient has been actively warmed in preoperative area. Preoperative antibiotics have been ordered and given within 1 hours of incision. Venous thrombosis prophylaxis have been ordered including bilateral sequential compression devices and chemical prophylaxis    Procedure Details: The patient was identified in preoperative holding area with correct name and medical record number. Informed consent was reviewed and all remaining questions answered. The patient was taken to the operating room where she had sequential compression devices  placed and received perioperative antibiotics.  She was placed under general anesthesia  without complications and then positioned in low lithotomy position.  After sterile prep and draping, a Callaway catheter was placed.  A single-tooth tenaculum was placed on the anterior lip of the cervix. A 1.25:1 solution of indocyanine green and sterile water was injected into the cervix at 3 and 9 o'clock both superficial and deep for sentinel lymph node mapping. The cervix was sequentially dilated and a medium V-Care uterine manipulator was placed without difficulty. The tenaculum and speculum were removed from the vagina.     Attention was then turned to the patient's abdomen where a 10mm incision was made in the LUQ. A 12mm AirSeal port was then inserted under visualization and pneumoperitoneum established without difficulty. Entry site was inspected and surveillance of the abdomen performed, demonstrating no evidence  of bowel or vascular injury at entry. Findings are as documented. The decision was made to proceed with placement of the robotic trocars: two 8mm right-sided ports, a supraumbilical port and a left lateral robotic port were placed under direct visualization without difficulty. Moderate upper abdominal adhesions were lysed using a combination of sharp and electrocautery dissection with endoscopic levon. At this point, the patient was placed in steep Trendelenburg position and the bowel was swept out of the pelvis. Robot was docked without any difficulty.      The uterus was then anteverted.  The round ligament was identified on the left side and the posterior leaf of the broad ligament was transected using monopolar scissors. The retroperitoneum was entered and the peritoneum was incised in a plane lateral and parallel to the ovarian blood supply.  The infundibulopelvic ligament was skeletonized. The ureter was visualized and well posterior to the ovarian blood supply and retroperitoneal spaces were developed bluntly. [place] lymph node mapping was successful and noted to map to the external iliac chain. The sentinel node was gently grasped and a combination of blunt, sharp, and electrocautery dissection was used to excise the node. Firefly mode confirmed successfull removal and the node was removed thorugh the assistant port and submitted to Pathology.    The retroperitoneal space was opened and explored on right side. The peritoneum was incised anterior to the external iliac artery, and again sentinel lymph node mapping was successful, mapping to the right obturator fossa after identification of underlying obturator nerve. [place] lymph nodes were removed through assistant port in endocatch bag without difficulty.      We then proceeded with hysterectomy.  The left round ligament was divided using electrocautery. A window was made under the infundibulopelvic ligament with the monopolar scissors and the IP  ligament was desiccated and transected using fenestrated bipolar device. Moderate retroperitoneal fibrosis was noted on the left necessitating ureterolysis to complete mobilization of the uterine vessels. A bladder flap was then developed on the left side using monopolar scissors. After skeletonizing the uterine artery, it was taken with bipolar device.  The cardinal and uterosacral ligaments were divided using bipolar and monopolar devices, with each pedicle created medial to the previous to prevent ureteral injury.    Attention was then turned to the patient's right side where in a similar fashion, the steps were repeated including desiccating and transecting the round ligament and dividing and sealing the infundibulopelvic ligament. The bladder flap was completed on the right side.  The uterine arteries were skeletonized and were taken using fenestrated bipolar device.  The cardinal and uterosacral ligaments were divided in a similar manner as the left side.  At this point, the uterine manipulator was clearly palpated with the robotic arm and the cervicovaginal junction was incised circumferentially with monopolar scissors without any difficulty.  The uterus with ovaries and fallopian tubes was then delivered through the vagina after being placed in an Endocatch bag and submitted to Pathology for further evaluation.  At this point, all pedicles were inspected and noted to be hemostatic.      The vaginal cuff was re-approximated using #0 Stratifix suture in a running fashion. A careful surveillance of the pelvis was performed and no evidence of bowel, vascular, urinary tract or nerve injury was noted after careful inspection. Copious irrigation was performed and no additional bleeding was noted. The decision was then made to proceed with closure. The robot was undocked. A Piage device was used to reapproximate fascia at the 12mm assistant port site with a 0 Vicryl tie and palpated intact. We proceeded  with bilateral TAP blocks using a combination of 0.5% bupivicane with 40mg dexamethasone reconstituted in total volume of 40cc, 20cc injected in TAP plane bilaterally under laparoscopic guidance. The remaining ports were removed under visualization. After reversal of the pneumoperitoneum and removal of all ports, the skin incisions were closed with 4-0 Monocryl in a subcuticular fashion. Steristrips and a sterile dressing was placed over each of the closed incision sites. The patient tolerated the procedure well.     Sponge, lap, and needle counts were correct x2. She was reversed from her general anesthesia and was taken to the recovery room in stable condition.    Evidence of Infection: No   Complications:  None; patient tolerated the procedure well.    Disposition: PACU - hemodynamically stable.  Condition: stable     Attending Attestation: I was present and scrubbed for the entire procedure.    Nga Nur  Phone Number: [phone]

## 2025-07-01 NOTE — H&P
History Of Present Illness  Kathy Lara is a 64 y.o. female presenting with endometrial cancer.     Past Medical History  Medical History[1]    Surgical History  Surgical History[2]     Social History  She reports that she has never smoked. She has never used smokeless tobacco. She reports that she does not currently use alcohol. Drug use questions deferred to the physician.    Family History  Family History[3]     Allergies  Morphine    Review of Systems  A comprehensive review of systems was performed and otherwise negative.     Physical Exam  General:   alert and oriented, in no acute distress   HEENT:  Normocephalic, atraumatic - sclera aneicteric   Cardiovascular: No JVD present, no apparent distress    Lungs: Normal respirations, No increased work of breathing   Abdomen: Flat, no obvious masses   Extremities: Spontaneously moving all extremities    Skin: Intact, no rashes/lesions   Neurologic:  CN III-XII grossly intact, no deficits        Last Recorded Vitals  Blood pressure 139/77, pulse 87, temperature 36 °C (96.8 °F), temperature source Temporal, resp. rate 18, height (!) 1.524 m (5'), weight 103 kg (226 lb 13.7 oz), SpO2 98%.    Relevant Results      Results for orders placed or performed during the hospital encounter of 07/01/25 (from the past 96 hours)   POCT GLUCOSE   Result Value Ref Range    POCT Glucose 181 (H) 74 - 99 mg/dL     Lab Results   Component Value Date    GLUCOSE 187 (H) 06/27/2025    CALCIUM 9.2 06/27/2025     06/27/2025    K 4.1 06/27/2025    CO2 29 06/27/2025     06/27/2025    BUN 28 (H) 06/27/2025    CREATININE 2.48 (H) 06/27/2025     Lab Results   Component Value Date    WBC 9.6 06/27/2025    HGB 11.8 (L) 06/27/2025    HCT 36.6 06/27/2025    MCV 92 06/27/2025     06/27/2025       Assessment & Plan  Endometrial cancer (Multi)      To OR for planned RA TLH/BSO, sLND with overnight observation due to medical comorbidities.   - Will require transportation in AM for  discharge home when meeting appropriate criteria       Nga Nur MD         [1]   Past Medical History:  Diagnosis Date    Anemia     CKD (chronic kidney disease), stage IV (Multi)     DR JUICE DUBOIS    CVA (cerebral vascular accident) (Multi) 2007    Hemorrhagic    Essential (primary) hypertension     GERD (gastroesophageal reflux disease)     Gout     ALLOPURINAL    Mixed hyperlipidemia     Personal history of other diseases of the circulatory system 06/10/2014    History of hypertension    Personal history of other endocrine, nutritional and metabolic disease 06/10/2014    History of diabetes mellitus    Snores     STOP-BANG=5    Type 2 diabetes mellitus with hyperglycemia, with long-term current use of insulin 1995    A1C=8.8 ON 5/16/25   [2]   Past Surgical History:  Procedure Laterality Date    APPENDECTOMY  06/10/2014    Appendectomy    CATARACT EXTRACTION EXTRACAPSULAR W/ INTRAOCULAR LENS IMPLANTATION      COLONOSCOPY      ESOPHAGOGASTRODUODENOSCOPY      EYE SURGERY  06/10/2014    Eye Surgery    SHOULDER SURGERY  06/10/2014    Shoulder Surgery   [3]   Family History  Problem Relation Name Age of Onset    Diabetes Mother      Heart disease Father      Diabetes Father      Stroke Father      Diabetes Sister      Drug abuse Brother

## 2025-07-01 NOTE — ANESTHESIA POSTPROCEDURE EVALUATION
Patient: Kahty Lara    Procedure Summary       Date: 07/01/25 Room / Location: [facility] A OR 01 / Virtual [facility] A OR    Anesthesia Start: 0828 Anesthesia Stop: 1204    Procedure: Robotic Laparoscopic Total Hysterectomy; Bilateral Salpingo Oophorectomy; [place] Lymph Node Biopsies, lysis of adhesions, left ureteral lysis (Bilateral: Abdomen) Diagnosis:       Endometrial cancer (Multi)      (Endometrial cancer (Multi) [C54.1])    Surgeons: Nga Nur MD Responsible Provider: Jim Graff MD    Anesthesia Type: general ASA Status: 3            Anesthesia Type: general    Vitals Value Taken Time   /78 07/01/25 12:45   Temp 36 °C (96.8 °F) 07/01/25 12:45   Pulse 75 07/01/25 12:54   Resp 11 07/01/25 12:45   SpO2 100 % 07/01/25 12:54   Vitals shown include unfiled device data.    Anesthesia Post Evaluation    Patient location during evaluation: bedside  Patient participation: complete - patient participated  Level of consciousness: awake  Pain management: adequate  Multimodal analgesia pain management approach  Airway patency: patent  Cardiovascular status: stable  Respiratory status: spontaneous ventilation and unassisted  Hydration status: acceptable  Postoperative Nausea and Vomiting: none  Comments: No significant PONV.        No notable events documented.

## 2025-07-01 NOTE — ANESTHESIA PROCEDURE NOTES
Airway  Date/Time: 7/1/2025 8:38 AM  Reason: elective    Airway not difficult    Staffing  Performed: CRNA   Authorized by: Jim Graff MD    Performed by: KIMBERLY Pike-RICH  Patient location during procedure: OR    Patient Condition  Indications for airway management: anesthesia  Patient position: sniffing  Planned trial extubation  Sedation level: deep     Final Airway Details   Preoxygenated: yes  Final airway type: endotracheal airway  Successful airway: ETT  Cuffed: yes   Successful intubation technique: video laryngoscopy  Adjuncts used in placement: intubating stylet  Endotracheal tube insertion site: oral  Blade type: roberts-elective.  Blade size: #3  ETT size (mm): 7.0  Cormack-Lehane Classification: grade I - full view of glottis  Placement verified by: chest auscultation, capnometry and palpation of cuff   Cuff volume (mL): 6  Measured from: lips  ETT to lips (cm): 21  Number of attempts at approach: 1    Additional Comments  Dentition unchanged

## 2025-07-01 NOTE — ANESTHESIA PROCEDURE NOTES
Peripheral IV  Date/Time: 7/1/2025 8:41 AM  Inserted by: Jim Graff MD    Placement  Needle size: 20 G  Laterality: left  Location: wrist  Local anesthetic: none  Site prep: alcohol  Technique: anatomical landmarks  Attempts: 1         

## 2025-07-01 NOTE — ANESTHESIA PREPROCEDURE EVALUATION
Patient: Kathy Lara    Procedure Information       Date/Time: 07/01/25 0805    Procedure: Robotic Laparoscopic Total Hysterectomy; Bilateral Salpingo Oophorectomy; [place] Lymph Node Biopsies (Bilateral: Abdomen)    Location: [facility] A OR 01 / [facility] OR    Surgeons: Nga Nur MD          History Comments   Personal history of other diseases of the circulatory system History of hypertension   Personal history of other endocrine, nutritional and metabolic disease History of diabetes mellitus   Type 2 diabetes mellitus with hyperglycemia, with long-term current use of insulin A1C=8.8 ON 5/16/25   Essential (primary) hypertension    Mixed hyperlipidemia    CKD (chronic kidney disease), stage IV (Multi) DR JUICE DUBOIS   Snxiomara STOP-BANG=5   GERD (gastroesophageal reflux disease)    Gout ALLOPURINAL   Anemia    CVA (cerebral vascular accident) (Multi) Hemorrhagic       Relevant Problems   Cardiac   (+) Essential (primary) hypertension   (+) Mastodynia   (+) Mixed hyperlipidemia      Neuro   (+) Cerebrovascular accident (Multi)      Endocrine   (+) Type 2 diabetes mellitus with hyperglycemia, with long-term current use of insulin      GYN   (+) Endometrial cancer (Multi)       Clinical information reviewed:    Allergies                NPO Detail:  No data recorded     Physical Exam    Airway  Mallampati: II  TM distance: >3 FB  Neck ROM: full     Cardiovascular   Rhythm: regular  Rate: normal     Dental    Pulmonary Breath sounds clear to auscultation     Abdominal            Anesthesia Plan    History of general anesthesia?: yes  History of complications of general anesthesia?: no    ASA 3     general     intravenous induction   Anesthetic plan and risks discussed with patient.    Plan discussed with CRNA.

## 2025-07-02 ENCOUNTER — PHARMACY VISIT (OUTPATIENT)
Dept: PHARMACY | Facility: CLINIC | Age: 65
End: 2025-07-02
Payer: COMMERCIAL

## 2025-07-02 VITALS
BODY MASS INDEX: 44.54 KG/M2 | TEMPERATURE: 98.8 F | SYSTOLIC BLOOD PRESSURE: 153 MMHG | OXYGEN SATURATION: 99 % | HEIGHT: 60 IN | RESPIRATION RATE: 20 BRPM | WEIGHT: 226.85 LBS | DIASTOLIC BLOOD PRESSURE: 91 MMHG | HEART RATE: 82 BPM

## 2025-07-02 LAB
GLUCOSE BLD MANUAL STRIP-MCNC: 236 MG/DL (ref 74–99)
GLUCOSE BLD MANUAL STRIP-MCNC: 245 MG/DL (ref 74–99)

## 2025-07-02 PROCEDURE — 82947 ASSAY GLUCOSE BLOOD QUANT: CPT | Mod: 91

## 2025-07-02 PROCEDURE — RXMED WILLOW AMBULATORY MEDICATION CHARGE

## 2025-07-02 PROCEDURE — 2500000001 HC RX 250 WO HCPCS SELF ADMINISTERED DRUGS (ALT 637 FOR MEDICARE OP): Performed by: STUDENT IN AN ORGANIZED HEALTH CARE EDUCATION/TRAINING PROGRAM

## 2025-07-02 PROCEDURE — 2500000002 HC RX 250 W HCPCS SELF ADMINISTERED DRUGS (ALT 637 FOR MEDICARE OP, ALT 636 FOR OP/ED): Performed by: STUDENT IN AN ORGANIZED HEALTH CARE EDUCATION/TRAINING PROGRAM

## 2025-07-02 PROCEDURE — 2500000004 HC RX 250 GENERAL PHARMACY W/ HCPCS (ALT 636 FOR OP/ED): Performed by: STUDENT IN AN ORGANIZED HEALTH CARE EDUCATION/TRAINING PROGRAM

## 2025-07-02 PROCEDURE — 96372 THER/PROPH/DIAG INJ SC/IM: CPT | Performed by: STUDENT IN AN ORGANIZED HEALTH CARE EDUCATION/TRAINING PROGRAM

## 2025-07-02 PROCEDURE — 9420000001 HC RT PATIENT EDUCATION 5 MIN

## 2025-07-02 PROCEDURE — 99024 POSTOP FOLLOW-UP VISIT: CPT | Performed by: STUDENT IN AN ORGANIZED HEALTH CARE EDUCATION/TRAINING PROGRAM

## 2025-07-02 PROCEDURE — 7100000011 HC EXTENDED STAY RECOVERY HOURLY - NURSING UNIT

## 2025-07-02 RX ORDER — ONDANSETRON 4 MG/1
8 TABLET, FILM COATED ORAL EVERY 8 HOURS PRN
Qty: 20 TABLET | Refills: 0 | Status: SHIPPED | OUTPATIENT
Start: 2025-07-02 | End: 2025-07-09

## 2025-07-02 RX ORDER — POLYETHYLENE GLYCOL 3350 17 G/17G
17 POWDER, FOR SOLUTION ORAL DAILY PRN
Qty: 238 G | Refills: 0 | Status: SHIPPED | OUTPATIENT
Start: 2025-07-02

## 2025-07-02 RX ORDER — ACETAMINOPHEN 500 MG
1000 TABLET ORAL EVERY 6 HOURS PRN
Qty: 30 TABLET | Refills: 0 | Status: SHIPPED | OUTPATIENT
Start: 2025-07-02 | End: 2025-07-12

## 2025-07-02 RX ORDER — TRAMADOL HYDROCHLORIDE 50 MG/1
50 TABLET, FILM COATED ORAL EVERY 12 HOURS PRN
Qty: 10 TABLET | Refills: 0 | Status: SHIPPED | OUTPATIENT
Start: 2025-07-02 | End: 2025-07-07

## 2025-07-02 RX ADMIN — PANTOPRAZOLE SODIUM 40 MG: 40 TABLET, DELAYED RELEASE ORAL at 06:03

## 2025-07-02 RX ADMIN — HEPARIN SODIUM 7500 UNITS: 5000 INJECTION, SOLUTION INTRAVENOUS; SUBCUTANEOUS at 06:03

## 2025-07-02 RX ADMIN — CLONIDINE HYDROCHLORIDE 0.2 MG: 0.2 TABLET ORAL at 06:03

## 2025-07-02 RX ADMIN — FUROSEMIDE 40 MG: 40 TABLET ORAL at 08:39

## 2025-07-02 RX ADMIN — ACETAMINOPHEN 975 MG: 325 TABLET ORAL at 04:24

## 2025-07-02 RX ADMIN — CLONIDINE HYDROCHLORIDE 0.2 MG: 0.2 TABLET ORAL at 14:44

## 2025-07-02 RX ADMIN — HEPARIN SODIUM 7500 UNITS: 5000 INJECTION, SOLUTION INTRAVENOUS; SUBCUTANEOUS at 14:44

## 2025-07-02 RX ADMIN — METOPROLOL SUCCINATE 50 MG: 50 TABLET, EXTENDED RELEASE ORAL at 08:39

## 2025-07-02 RX ADMIN — INSULIN LISPRO 6 UNITS: 100 INJECTION, SOLUTION INTRAVENOUS; SUBCUTANEOUS at 12:12

## 2025-07-02 RX ADMIN — LOSARTAN POTASSIUM 100 MG: 50 TABLET, FILM COATED ORAL at 08:39

## 2025-07-02 RX ADMIN — INSULIN LISPRO 6 UNITS: 100 INJECTION, SOLUTION INTRAVENOUS; SUBCUTANEOUS at 08:44

## 2025-07-02 RX ADMIN — ACETAMINOPHEN 975 MG: 325 TABLET ORAL at 09:54

## 2025-07-02 RX ADMIN — POLYETHYLENE GLYCOL 3350 17 G: 17 POWDER, FOR SOLUTION ORAL at 08:39

## 2025-07-02 RX ADMIN — ATORVASTATIN CALCIUM 40 MG: 40 TABLET, FILM COATED ORAL at 08:39

## 2025-07-02 SDOH — HEALTH STABILITY: MENTAL HEALTH: HOW OFTEN DO YOU HAVE SIX OR MORE DRINKS ON ONE OCCASION?: NEVER

## 2025-07-02 SDOH — ECONOMIC STABILITY: HOUSING INSECURITY: AT ANY TIME IN THE PAST 12 MONTHS, WERE YOU HOMELESS OR LIVING IN A SHELTER (INCLUDING NOW)?: NO

## 2025-07-02 SDOH — HEALTH STABILITY: MENTAL HEALTH: HOW OFTEN DO YOU HAVE A DRINK CONTAINING ALCOHOL?: NEVER

## 2025-07-02 SDOH — ECONOMIC STABILITY: FOOD INSECURITY: WITHIN THE PAST 12 MONTHS, THE FOOD YOU BOUGHT JUST DIDN'T LAST AND YOU DIDN'T HAVE MONEY TO GET MORE.: NEVER TRUE

## 2025-07-02 SDOH — ECONOMIC STABILITY: HOUSING INSECURITY: IN THE PAST 12 MONTHS, HOW MANY TIMES HAVE YOU MOVED WHERE YOU WERE LIVING?: 0

## 2025-07-02 SDOH — ECONOMIC STABILITY: HOUSING INSECURITY: IN THE LAST 12 MONTHS, WAS THERE A TIME WHEN YOU WERE NOT ABLE TO PAY THE MORTGAGE OR RENT ON TIME?: NO

## 2025-07-02 SDOH — ECONOMIC STABILITY: INCOME INSECURITY: IN THE PAST 12 MONTHS HAS THE ELECTRIC, GAS, OIL, OR WATER COMPANY THREATENED TO SHUT OFF SERVICES IN YOUR HOME?: NO

## 2025-07-02 SDOH — ECONOMIC STABILITY: FOOD INSECURITY: WITHIN THE PAST 12 MONTHS, YOU WORRIED THAT YOUR FOOD WOULD RUN OUT BEFORE YOU GOT THE MONEY TO BUY MORE.: NEVER TRUE

## 2025-07-02 SDOH — ECONOMIC STABILITY: FOOD INSECURITY: HOW HARD IS IT FOR YOU TO PAY FOR THE VERY BASICS LIKE FOOD, HOUSING, MEDICAL CARE, AND HEATING?: NOT HARD AT ALL

## 2025-07-02 SDOH — HEALTH STABILITY: MENTAL HEALTH: HOW MANY DRINKS CONTAINING ALCOHOL DO YOU HAVE ON A TYPICAL DAY WHEN YOU ARE DRINKING?: PATIENT DOES NOT DRINK

## 2025-07-02 SDOH — ECONOMIC STABILITY: TRANSPORTATION INSECURITY: IN THE PAST 12 MONTHS, HAS LACK OF TRANSPORTATION KEPT YOU FROM MEDICAL APPOINTMENTS OR FROM GETTING MEDICATIONS?: YES

## 2025-07-02 ASSESSMENT — COGNITIVE AND FUNCTIONAL STATUS - GENERAL
DAILY ACTIVITIY SCORE: 19
WALKING IN HOSPITAL ROOM: A LITTLE
TOILETING: A LITTLE
STANDING UP FROM CHAIR USING ARMS: A LITTLE
MOBILITY SCORE: 18
MOVING FROM LYING ON BACK TO SITTING ON SIDE OF FLAT BED WITH BEDRAILS: A LITTLE
DRESSING REGULAR UPPER BODY CLOTHING: A LITTLE
TOILETING: A LITTLE
DRESSING REGULAR LOWER BODY CLOTHING: A LITTLE
STANDING UP FROM CHAIR USING ARMS: A LITTLE
TURNING FROM BACK TO SIDE WHILE IN FLAT BAD: A LITTLE
TURNING FROM BACK TO SIDE WHILE IN FLAT BAD: A LITTLE
MOVING TO AND FROM BED TO CHAIR: A LITTLE
WALKING IN HOSPITAL ROOM: A LITTLE
STANDING UP FROM CHAIR USING ARMS: A LITTLE
CLIMB 3 TO 5 STEPS WITH RAILING: A LITTLE
MOVING TO AND FROM BED TO CHAIR: A LITTLE
TURNING FROM BACK TO SIDE WHILE IN FLAT BAD: A LITTLE
TOILETING: A LITTLE
PERSONAL GROOMING: A LITTLE
MOVING FROM LYING ON BACK TO SITTING ON SIDE OF FLAT BED WITH BEDRAILS: A LITTLE
DRESSING REGULAR UPPER BODY CLOTHING: A LITTLE
MOBILITY SCORE: 18
TURNING FROM BACK TO SIDE WHILE IN FLAT BAD: A LITTLE
MOVING TO AND FROM BED TO CHAIR: A LITTLE
DAILY ACTIVITIY SCORE: 19
HELP NEEDED FOR BATHING: A LITTLE
MOVING TO AND FROM BED TO CHAIR: A LITTLE
DRESSING REGULAR UPPER BODY CLOTHING: A LITTLE
WALKING IN HOSPITAL ROOM: A LITTLE
WALKING IN HOSPITAL ROOM: A LITTLE
TOILETING: A LITTLE
PERSONAL GROOMING: A LITTLE
DRESSING REGULAR UPPER BODY CLOTHING: A LITTLE
PERSONAL GROOMING: A LITTLE
PERSONAL GROOMING: A LITTLE
MOBILITY SCORE: 18
MOBILITY SCORE: 18
STANDING UP FROM CHAIR USING ARMS: A LITTLE
PERSONAL GROOMING: A LITTLE
MOVING TO AND FROM BED TO CHAIR: A LITTLE
STANDING UP FROM CHAIR USING ARMS: A LITTLE
TURNING FROM BACK TO SIDE WHILE IN FLAT BAD: A LITTLE
DRESSING REGULAR LOWER BODY CLOTHING: A LITTLE
CLIMB 3 TO 5 STEPS WITH RAILING: A LITTLE
DRESSING REGULAR UPPER BODY CLOTHING: A LITTLE
DAILY ACTIVITIY SCORE: 19
CLIMB 3 TO 5 STEPS WITH RAILING: A LITTLE
MOVING FROM LYING ON BACK TO SITTING ON SIDE OF FLAT BED WITH BEDRAILS: A LITTLE
HELP NEEDED FOR BATHING: A LITTLE
MOBILITY SCORE: 18
WALKING IN HOSPITAL ROOM: A LITTLE
HELP NEEDED FOR BATHING: A LITTLE
MOVING FROM LYING ON BACK TO SITTING ON SIDE OF FLAT BED WITH BEDRAILS: A LITTLE
DAILY ACTIVITIY SCORE: 19
MOVING FROM LYING ON BACK TO SITTING ON SIDE OF FLAT BED WITH BEDRAILS: A LITTLE
DRESSING REGULAR LOWER BODY CLOTHING: A LITTLE
HELP NEEDED FOR BATHING: A LITTLE
CLIMB 3 TO 5 STEPS WITH RAILING: A LITTLE
CLIMB 3 TO 5 STEPS WITH RAILING: A LITTLE
DAILY ACTIVITIY SCORE: 19
TOILETING: A LITTLE
HELP NEEDED FOR BATHING: A LITTLE

## 2025-07-02 ASSESSMENT — PAIN DESCRIPTION - DESCRIPTORS: DESCRIPTORS: ACHING

## 2025-07-02 ASSESSMENT — LIFESTYLE VARIABLES
SKIP TO QUESTIONS 9-10: 1
AUDIT-C TOTAL SCORE: 0

## 2025-07-02 ASSESSMENT — PAIN DESCRIPTION - LOCATION: LOCATION: ABDOMEN

## 2025-07-02 ASSESSMENT — ACTIVITIES OF DAILY LIVING (ADL): LACK_OF_TRANSPORTATION: YES

## 2025-07-02 ASSESSMENT — PAIN - FUNCTIONAL ASSESSMENT: PAIN_FUNCTIONAL_ASSESSMENT: 0-10

## 2025-07-02 ASSESSMENT — PAIN SCALES - GENERAL: PAINLEVEL_OUTOF10: 3

## 2025-07-02 NOTE — DISCHARGE INSTRUCTIONS
Laparoscopic Hysterectomy Discharge Instructions  If you have any questions about your care, please contact us at [phone].    Medications and Pain Management  Common areas of pain after laparoscopic hysterectomy include the incision pain, pain in between your shoulder blades, the pelvis and lower back. The gas that was used to distend your abdomen for the surgery is absorbed slowly into your blood stream over the first 3-4 days after surgery. It is not passed intestinally, although, because your abdomen is distended, it may feel similar to intestinal gas. Staying active and walking is the best way to promote the absorption of this gas.  Immediately after surgery, nerve pain is the most intense, typically for the first 6 to 12 hours. As the body heals, it creates inflammation around the incisions sites adding pressure and creating soreness. After 5 days, the inflammation begins to recede and significant improvement in soreness is expected. Pulling on the incisions, especially if sudden, such as when you cough, will reactivate the nerve pain. Support your abdomen with a pillow during coughing or sneezing as this will be helpful to minimize pain. There are two types of pain pills typically used for post-operative pain management, narcotics such as tramadol and an anti-inflammatory such as Tylenol.  Taking regular anti-inflammatory pills, such as 500mg acetaminophen every 6-8 hours for the first 5 days and then as needed is recommended.  Tylenol can be taken every 6 hours. The narcotic can be used on a schedule for the first 1 to 2 days but after that, only as you need it. Narcotics can cause constipation, nausea, sleepiness and headaches. You may begin your usual home medications as you were taking before unless directed by your doctor.    Incisional care  Paper tape steri-strips are typically used for the abdominal incisions. The steri-strips will fall off on their own or can be removed at your first  post-operative appointment. You may shower and use a mild soap around the incisions and pat dry. Do not use a washcloth or scrub the incisions. Using peroxide or antiseptics is not recommended for routine care. Avoid hot and steamy showers as this may cause you to feel faint. No tub baths for six weeks following surgery. There may be discoloration or bruising around the incisions. This is normal and may take several weeks to resolve. Firmness or a nodular area under the skin near the incision may represent a collection of blood, this too will resolve on its own after a little time. If any incision develops tenderness, redness in the skin layer or has drainage please call the office.    Vaginal Discharge  You may have a mildly malodorous discharge and occasional spotting for up to 6 weeks. Do not put anything in the vagina like tampons or have sexual intercourse for 6 weeks after surgery. If you are having bleeding like a period, that is abnormal and you should contact your doctor.    GI Function, Nausea and Constipation  Nausea can occasionally be an issue in the first few days after surgery. It is usually caused as a side effect from the anesthesia and pain medicine, particularly narcotics. Taking the pain pills with food is a food way to proactively minimize this. Throwing up, especially after the first day, is not expected and if this happens, you should call your doctor. Feeling gassy and constipation can be a problem for the first week after surgery. Limiting the use of narcotics may be helpful. Stool softeners twice a day and a high fiber diet are safe. If needed, Miralax once daily is a good choice. If no bowel movement after 3 days, you will need to increase the Miralax until soft, regular bowel movements are passing.    Urinating  Because the bladder is disturbed by the surgery, the normal sensation may be temporarily altered. You may not be aware that your bladder is full. If the bladder is allowed to get  over distended, it may make the problem worse. This is why we make sure that you are able to empty your bladder adequately before you go home. For the first few days at home, you should make a point to empty your bladder every 3 to 4 hours. Pain with voiding, especially after the first day, is not expected and may represent a bladder infection.    Activity  For the first two days post-operatively, your soreness and recovery from anesthesia will limit your activity  Stairs are safe, just take your time  At a minimum during this time, you should walk around for 10-15 minutes every 2-3 hours. After that, in the first week, any activity except for overt exercise is safe.   During the first week you should not commit to being on your feet for more than 30 minutes at a time.   During the second week, light exercise is encouraged.  After 2 weeks from surgery, you should try to get back into regular activity other than heavy lifting.   For healing, please limit the amount of weight lifted to 8-10 pounds (a gallon of milk) for the first 6 weeks after surgery.   Driving is usually okay after the first few days. You must be able to comfortably wear a seatbelt, press the gas/brake pedals, and drive defensively. You may not drive while taking narcotic pain medicines.    When to Call the Doctor  Call for any fever above 100.4 F (If you do not feel feverish you do not have to routinely check your temperature.)  Call for severe pain not improved by medications  Call for persistent nausea, vomiting  Call for vaginal bleeding that is heavy as a period or passing blood clots larger than a quarter  Call for unusual swelling in your legs  Call if the incisions develop painful redness and discharge    In an emergency, call 911 or go to an Emergency Department at a nearby hospital

## 2025-07-02 NOTE — NURSING NOTE
"Discharge instructions provided using teach back method. Pt's health related  risk factors discussed with pt. pt educated to look for any worsening sign and symptoms. Pt educated to seek medical attention if experience any medical emergency. Pt aware to follow up with outpatient clinics as scheduled. Home going meds reviewed with pt. Pt verbalized understanding of disposition and discharge instructions. All questions answered to patient's satisfaction and within nursing scope of practice. Vitals stable, IVs removed. Pt very tearful regarding discharging home.  States she just wants to stay another night.  When asked if there was a medical issue she felt was not addressed she wouldn't give an answer and just kept tearfully replying \"I just want to stay another night.\"  Dressings all intact, patient ambulating with steady gait, tolerating foods, passing gas and belching, etc.  Stated she wanted to see our psychiatrist although patient has a longstanding psychiatrist she can call during business hours and stated she will call when she gets home to her cell phone.  Address confirmed with patient.  Floor  to arrange for lyft so patient can transport home.      "

## 2025-07-02 NOTE — CARE PLAN
Problem: Pain - Adult  Goal: Verbalizes/displays adequate comfort level or baseline comfort level  Outcome: Progressing     Problem: Safety - Adult  Goal: Free from fall injury  Outcome: Progressing     Problem: Discharge Planning  Goal: Discharge to home or other facility with appropriate resources  Outcome: Progressing     Problem: Chronic Conditions and Co-morbidities  Goal: Patient's chronic conditions and co-morbidity symptoms are monitored and maintained or improved  Outcome: Progressing     Problem: Nutrition  Goal: Nutrient intake appropriate for maintaining nutritional needs  Outcome: Progressing   The patient's goals for the shift include  stay safe and pain free    The clinical goals for the shift include to be comfy

## 2025-07-02 NOTE — PROGRESS NOTES
Kathy Lara is a 64 y.o. female on day 1 of admission presenting with Endometrial cancer (Multi).    Subjective   63yo POD#1 s/p RA TLH/BSO, sLND with lysis of adhesions for endometrial cancer. Reports abdominal soreness with position changes overall doing as anticipated. Voiding via purewick overnight; denies nausea/vomiting. Tolerating regular diet. Mild dizziness OOB overnight.        Objective     Physical Exam  Vitals and nursing note reviewed. Exam conducted with a chaperone present.   Constitutional:       Appearance: Normal appearance. She is normal weight.   HENT:      Head: Normocephalic and atraumatic.      Mouth/Throat:      Mouth: Mucous membranes are moist.   Eyes:      Extraocular Movements: Extraocular movements intact.      Conjunctiva/sclera: Conjunctivae normal.      Pupils: Pupils are equal, round, and reactive to light.   Cardiovascular:      Rate and Rhythm: Normal rate.   Abdominal:      General: Bowel sounds are normal.      Palpations: Abdomen is soft. There is no mass.      Tenderness: There is no guarding or rebound.      Hernia: No hernia is present.      Comments: Laparoscopic incisions C/D/I without redness, drainage or erythema   Genitourinary:     Comments: Deferred  Musculoskeletal:         General: Normal range of motion.      Cervical back: Normal range of motion and neck supple.   Skin:     General: Skin is warm.      Findings: No erythema or rash.   Neurological:      General: No focal deficit present.      Mental Status: She is alert and oriented to person, place, and time. Mental status is at baseline.   Psychiatric:         Mood and Affect: Mood normal.         Behavior: Behavior normal.       Last Recorded Vitals  Blood pressure 145/82, pulse 69, temperature 36.1 °C (97 °F), resp. rate 18, height (!) 1.524 m (5'), weight 103 kg (226 lb 13.7 oz), SpO2 96%.  Intake/Output last 3 Shifts:  I/O last 3 completed shifts:  In: 1000 (9.7 mL/kg) [IV Piggyback:1000]  Out: 300 (2.9  mL/kg) [Urine:250 (0.1 mL/kg/hr); Blood:50]  Weight: 102.9 kg     Relevant Results    Results for orders placed or performed during the hospital encounter of 07/01/25 (from the past 24 hours)   Sterile Fluid Culture/Smear    Specimen: PELVIC WASHING; Fluid   Result Value Ref Range    Gram Stain No polymorphonuclear leukocytes seen     Gram Stain No organisms seen    POCT GLUCOSE   Result Value Ref Range    POCT Glucose 159 (H) 74 - 99 mg/dL   POCT GLUCOSE   Result Value Ref Range    POCT Glucose 230 (H) 74 - 99 mg/dL   POCT GLUCOSE   Result Value Ref Range    POCT Glucose 238 (H) 74 - 99 mg/dL            Assessment & Plan  Endometrial cancer (Multi)    POD#1 s/p RA TLH/BSO, sLND, lysis of adhesions - PMH T2DM with poor glycemic control, CKD, AMADOR meeting appropriate postoperative milestones  - Follow up OOB with voids  - Coordinate discharge planning with Social Work for transportation assistance  - In office follow up pending for pathology review; precautions reviewed       Nga Nur MD

## 2025-07-02 NOTE — PROGRESS NOTES
07/02/25 1115   Discharge Planning   Living Arrangements Alone   Support Systems Friends/neighbors   Assistance Needed transportation   Type of Residence Private residence   Number of Stairs to Enter Residence 3   Number of Stairs Within Residence 0   Do you have animals or pets at home? No   Who is requesting discharge planning? Provider   Home or Post Acute Services None   Expected Discharge Disposition Home   Does the patient need discharge transport arranged? Yes   [facility] coordination needed? Yes   Has discharge transport been arranged? No   Financial Resource Strain   How hard is it for you to pay for the very basics like food, housing, medical care, and heating? Not hard   Housing Stability   In the last 12 months, was there a time when you were not able to pay the mortgage or rent on time? N   In the past 12 months, how many times have you moved where you were living? 0   At any time in the past 12 months, were you homeless or living in a shelter (including now)? N   Transportation Needs   In the past 12 months, has lack of transportation kept you from medical appointments or from getting medications? yes   In the past 12 months, has lack of transportation kept you from meetings, work, or from getting things needed for daily living? Yes   Stroke Family Assessment   Stroke Family Assessment Needed No   Intensity of Service   Intensity of Service 0-30 min     7/2/25 1116  Met with patient at bedside to discuss discharge planning.  PCP is Dr Janine Feliciano and she would like to use meds to beds.  She lives at home alone in an apartment.  She is independent with ADLs.  She can drive but does not have a car.  She would like to speak with [facility] about resources for transportation/finances.  Sw notified.  She needs transportation home arranged at discharge.  She denies any additional HHC, DME, or discharge needs. She will notify the [facility] should any needs arise.  ADOD today.  Zoraida Segal RN [facility]

## 2025-07-02 NOTE — DISCHARGE SUMMARY
Discharge Diagnosis  Endometrial cancer (Multi)    Issues Requiring Follow-Up  Surgical pathology; diabetes management     Test Results Pending At Discharge  Pending Labs       Order Current Status    Surgical Pathology Exam In process    Sterile Fluid Culture/Smear Preliminary result            Hospital Course   Patient underwent robot-assisted TLH/BSO on 7/1/25 with an uneventful postoperative course. She was meeting appropriate milestones, voiding and ambulating on POD#1 and discharged home with outpatient follow up.     Visit Vitals  /82   Pulse 69   Temp 36.1 °C (97 °F)   Resp 18     Vitals:    07/01/25 0737   Weight: 103 kg (226 lb 13.7 oz)       Immunization History   Administered Date(s) Administered    COVID-19, mRNA, LNP-S, PF, 30 mcg/0.3 mL dose 04/23/2021, 05/10/2021    DTaP vaccine, pediatric  (INFANRIX) 03/30/2010    Flu vaccine (IIV4), preservative free *Check age/dose* 08/25/2015, 08/13/2017, 08/29/2018, 08/22/2019, 09/18/2020, 09/09/2021    Influenza, injectable, quadrivalent 09/01/2020, 10/04/2021    Pfizer COVID-19 vaccine, 12 years and older, (30mcg/0.3mL) (Comirnaty) 11/15/2024    Pfizer Gray Cap SARS-CoV-2 05/19/2022    Zoster vaccine, recombinant, adult (SHINGRIX) 09/09/2021    Zoster, live 10/13/2017       Results        Pertinent Physical Exam At Time of Discharge  Physical Exam  Vitals and nursing note reviewed.   Constitutional:       Appearance: Normal appearance. She is normal weight.   HENT:      Head: Normocephalic and atraumatic.      Mouth/Throat:      Mouth: Mucous membranes are moist.   Eyes:      Extraocular Movements: Extraocular movements intact.      Conjunctiva/sclera: Conjunctivae normal.      Pupils: Pupils are equal, round, and reactive to light.   Cardiovascular:      Rate and Rhythm: Normal rate.   Abdominal:      General: Bowel sounds are normal.      Palpations: Abdomen is soft. There is no mass.      Tenderness: There is no guarding or rebound.      Hernia: No  hernia is present.      Comments: Laparoscopic incisions C/D/I without redness, drainage or erythema   Musculoskeletal:         General: Normal range of motion.      Cervical back: Normal range of motion and neck supple.   Skin:     General: Skin is warm.      Findings: No erythema or rash.   Neurological:      General: No focal deficit present.      Mental Status: She is alert and oriented to person, place, and time. Mental status is at baseline.   Psychiatric:         Mood and Affect: Mood normal.         Behavior: Behavior normal.         Home Medications     Medication List      START taking these medications     acetaminophen 500 mg tablet; Commonly known as: Tylenol; Take 2 tablets   (1,000 mg) by mouth every 6 hours if needed for mild pain (1 - 3) for up   to 10 days.   ondansetron 4 mg tablet; Commonly known as: Zofran; Take 2 tablets (8   mg) by mouth every 8 hours if needed for nausea or vomiting for up to 7   days.   polyethylene glycol 17 gram/dose powder; Commonly known as: Miralax; Mix   17 g of powder and drink once daily as needed for constipation.   traMADol 50 mg tablet; Commonly known as: Ultram; Take 1 tablet (50 mg)   by mouth every 12 hours if needed for severe pain (7 - 10) for up to 5   days.     CHANGE how you take these medications     Ozempic 2 mg/dose (8 mg/3 mL) pen injector; Generic drug: semaglutide;   Inject 2 mg under the skin 1 (one) time per week.; What changed:   additional instructions     CONTINUE taking these medications     allopurinol 100 mg tablet; Commonly known as: Zyloprim; TAKE 1 TABLET BY   MOUTH ONE TIME DAILY ONCE DAILY   cyanocobalamin 1,000 mcg tablet; Commonly known as: Vitamin B-12; Take 1   tablet (1,000 mcg) by mouth once daily.   evening primrose oil 1,300 mg capsule   ferrous gluconate 324 mg (38 mg elemental) tablet; Commonly known as:   Fergon; TAKE 1 TABLET BY MOUTH ONCE DAILY IN THE MORNING. TAKE WITH MEALS   pioglitazone 15 mg tablet; Commonly known as:  Actos; TAKE 1/2 TABLET BY   MOUTH ONE TIME DAILY     ASK your doctor about these medications     atorvastatin 40 mg tablet; Commonly known as: Lipitor; TAKE 1 TABLET BY   MOUTH ONE TIME DAILY   cloNIDine 0.2 mg tablet; Commonly known as: Catapres; TAKE 1 TABLET BY   MOUTH THREE TIMES A DAY   furosemide 40 mg tablet; Commonly known as: Lasix; TAKE 1 TABLET BY   MOUTH ONE TIME DAILY   HumaLOG KwikPen Insulin 100 unit/mL pen; Generic drug: insulin lispro;   INJECT UP TO 50 UNITS SUBCUTANEOUSLY THREE TIMES A DAY   Lantus Solostar U-100 Insulin 100 unit/mL (3 mL) pen; Generic drug:   insulin glargine; INJECT SUBCUTANEOUSLY UP TO 50 UNITS DAILY   losartan 100 mg tablet; Commonly known as: Cozaar; TAKE 1 TABLET BY   MOUTH ONE TIME DAILY   metoprolol succinate XL 50 mg 24 hr tablet; Commonly known as:   Toprol-XL; Take 1 tablet by mouth twice daily   omeprazole 40 mg DR capsule; Commonly known as: PriLOSEC       Outpatient Follow-Up  Future Appointments   Date Time Provider Department Center   7/17/2025  2:20 PM Nga Nur MD [id number]       Nga Nur MD

## 2025-07-06 LAB
BACTERIA FLD CULT: NORMAL
GRAM STN SPEC: NORMAL
GRAM STN SPEC: NORMAL

## 2025-07-17 ENCOUNTER — APPOINTMENT (OUTPATIENT)
Dept: GYNECOLOGIC ONCOLOGY | Facility: CLINIC | Age: 65
End: 2025-07-17
Payer: COMMERCIAL

## 2025-07-21 DIAGNOSIS — Z79.4 TYPE 2 DIABETES MELLITUS WITH HYPERGLYCEMIA, WITH LONG-TERM CURRENT USE OF INSULIN: ICD-10-CM

## 2025-07-21 DIAGNOSIS — E11.65 TYPE 2 DIABETES MELLITUS WITH HYPERGLYCEMIA, WITH LONG-TERM CURRENT USE OF INSULIN: ICD-10-CM

## 2025-07-21 PROCEDURE — RXMED WILLOW AMBULATORY MEDICATION CHARGE

## 2025-07-21 RX ORDER — PIOGLITAZONE 15 MG/1
TABLET ORAL DAILY
Qty: 45 TABLET | Refills: 1 | Status: SHIPPED | OUTPATIENT
Start: 2025-07-21 | End: 2026-07-21

## 2025-07-22 ENCOUNTER — PHARMACY VISIT (OUTPATIENT)
Dept: PHARMACY | Facility: CLINIC | Age: 65
End: 2025-07-22
Payer: COMMERCIAL

## 2025-07-22 LAB
LAB AP BLOCK FOR ADDITIONAL STUDIES: NORMAL
LABORATORY COMMENT REPORT: NORMAL
PATH REPORT.COMMENTS IMP SPEC: NORMAL
PATH REPORT.FINAL DX SPEC: NORMAL
PATH REPORT.GROSS SPEC: NORMAL
PATH REPORT.RELEVANT HX SPEC: NORMAL
PATH REPORT.TOTAL CANCER: NORMAL
PATHOLOGY SYNOPTIC REPORT: NORMAL

## 2025-07-22 PROCEDURE — 88381 MICRODISSECTION MANUAL: CPT | Performed by: STUDENT IN AN ORGANIZED HEALTH CARE EDUCATION/TRAINING PROGRAM

## 2025-07-22 PROCEDURE — 88360 TUMOR IMMUNOHISTOCHEM/MANUAL: CPT | Performed by: STUDENT IN AN ORGANIZED HEALTH CARE EDUCATION/TRAINING PROGRAM

## 2025-07-25 DIAGNOSIS — Z79.4 TYPE 2 DIABETES MELLITUS WITH HYPERGLYCEMIA, WITH LONG-TERM CURRENT USE OF INSULIN: ICD-10-CM

## 2025-07-25 DIAGNOSIS — E11.65 TYPE 2 DIABETES MELLITUS WITH HYPERGLYCEMIA, WITH LONG-TERM CURRENT USE OF INSULIN: ICD-10-CM

## 2025-07-25 PROCEDURE — RXMED WILLOW AMBULATORY MEDICATION CHARGE

## 2025-07-25 RX ORDER — SEMAGLUTIDE 2.68 MG/ML
2 INJECTION, SOLUTION SUBCUTANEOUS
Qty: 9 ML | Refills: 1 | Status: SHIPPED | OUTPATIENT
Start: 2025-07-27

## 2025-07-29 ENCOUNTER — PHARMACY VISIT (OUTPATIENT)
Dept: PHARMACY | Facility: CLINIC | Age: 65
End: 2025-07-29
Payer: COMMERCIAL

## 2025-07-29 DIAGNOSIS — C54.1 ENDOMETRIAL CANCER DETERMINED BY UTERINE BIOPSY (MULTI): Primary | ICD-10-CM

## 2025-07-29 LAB
TEST COMMENT - SURGICAL SENDOUT REQUEST: NORMAL
TEST COMMENT - SURGICAL SENDOUT REQUEST: NORMAL

## 2025-07-31 NOTE — TUMOR BOARD NOTE
Gynecologic Oncology Tumor Board 2025    Specialties Present: Gyn Onc, Radiology, Genetics, Radiation oncology, Pathology, Nurse Navigator, Research    Kathy Lara  65 y.o.    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 The tumor board considered available treatment options and made the following recommendations.    Recommendations:      Consider referral to radiation oncology for adjuvant VBT vs. enrollment in . Follow up POLE testing.     Clinical Trial Consideration: NRG-: Stage I-III Endometrial Cancer with POLE, MMR, ER, p53 tests    National site-specific guidelines were discussed with respect to the case. It is recognized that there may be additional factors not discussed in the Review Board discussion that can influence management decisions, and alternative management options which fall within the standard of care. Accordingly the final treatment disposition will be determined by the patient, in the context of an informed discussion with their providers. The actual prescribed management or treatment plan may or may not be consistent with the Review Board recommendations.

## 2025-08-01 ENCOUNTER — TUMOR BOARD CONFERENCE (OUTPATIENT)
Dept: HEMATOLOGY/ONCOLOGY | Facility: HOSPITAL | Age: 65
End: 2025-08-01
Payer: COMMERCIAL

## 2025-08-01 ENCOUNTER — PHARMACY VISIT (OUTPATIENT)
Dept: PHARMACY | Facility: CLINIC | Age: 65
End: 2025-08-01
Payer: COMMERCIAL

## 2025-08-01 PROCEDURE — RXMED WILLOW AMBULATORY MEDICATION CHARGE

## 2025-08-05 LAB
ELECTRONICALLY SIGNED BY: NORMAL
Lab: NORMAL

## 2025-08-15 ENCOUNTER — TELEMEDICINE (OUTPATIENT)
Dept: GYNECOLOGIC ONCOLOGY | Facility: HOSPITAL | Age: 65
End: 2025-08-15
Payer: COMMERCIAL

## 2025-08-15 DIAGNOSIS — Z90.721 S/P HYSTERECTOMY WITH OOPHORECTOMY: ICD-10-CM

## 2025-08-15 DIAGNOSIS — Z90.710 S/P HYSTERECTOMY WITH OOPHORECTOMY: ICD-10-CM

## 2025-08-15 DIAGNOSIS — C54.1 ENDOMETRIAL CANCER (MULTI): Primary | ICD-10-CM

## 2025-08-20 PROCEDURE — RXMED WILLOW AMBULATORY MEDICATION CHARGE

## 2025-08-21 ENCOUNTER — APPOINTMENT (OUTPATIENT)
Dept: GYNECOLOGIC ONCOLOGY | Facility: CLINIC | Age: 65
End: 2025-08-21
Payer: COMMERCIAL

## 2025-08-22 ENCOUNTER — PHARMACY VISIT (OUTPATIENT)
Dept: PHARMACY | Facility: CLINIC | Age: 65
End: 2025-08-22
Payer: COMMERCIAL

## 2025-08-23 DIAGNOSIS — Z79.4 TYPE 2 DIABETES MELLITUS WITH HYPERGLYCEMIA, WITH LONG-TERM CURRENT USE OF INSULIN: ICD-10-CM

## 2025-08-23 DIAGNOSIS — E11.65 TYPE 2 DIABETES MELLITUS WITH HYPERGLYCEMIA, WITH LONG-TERM CURRENT USE OF INSULIN: ICD-10-CM

## 2025-08-24 PROCEDURE — RXMED WILLOW AMBULATORY MEDICATION CHARGE

## 2025-08-24 RX ORDER — ATORVASTATIN CALCIUM 40 MG/1
40 TABLET, FILM COATED ORAL DAILY
Qty: 90 TABLET | Refills: 1 | Status: SHIPPED | OUTPATIENT
Start: 2025-08-24 | End: 2026-08-24

## 2025-08-25 ENCOUNTER — PHARMACY VISIT (OUTPATIENT)
Dept: PHARMACY | Facility: CLINIC | Age: 65
End: 2025-08-25
Payer: COMMERCIAL

## 2025-09-03 PROCEDURE — RXMED WILLOW AMBULATORY MEDICATION CHARGE

## 2025-09-04 ENCOUNTER — APPOINTMENT (OUTPATIENT)
Dept: GYNECOLOGIC ONCOLOGY | Facility: CLINIC | Age: 65
End: 2025-09-04
Payer: COMMERCIAL

## 2025-09-06 ENCOUNTER — PHARMACY VISIT (OUTPATIENT)
Dept: PHARMACY | Facility: CLINIC | Age: 65
End: 2025-09-06
Payer: COMMERCIAL

## (undated) DEVICE — APPLICATOR, ENDOSCOPIC, F/SURGICEL POWDER

## (undated) DEVICE — RETRACTOR, CERVICAL CUP, VCARE, STANDARD

## (undated) DEVICE — RETRIEVAL SYSTEM, MONARCH, 10MM DISP ENDOSCOPIC

## (undated) DEVICE — SHEAR, W/UNIPOLAR CAUTERY, ENDOSHEAR, 5 MM

## (undated) DEVICE — DRAPE PACK, LAVH, W/ATTACHED LEGGINGS, W/POUCH, 100 X 114 IN, LF, STERILE

## (undated) DEVICE — SYRINGE, 20 CC, LUER LOCK

## (undated) DEVICE — PREP TRAY, VAGINAL

## (undated) DEVICE — HEMOSTAT, SURGICEL POWDER 3.0GRAMS

## (undated) DEVICE — GLOVE, SURGICAL, PROTEXIS PI MICRO, 7.0, PF, LF

## (undated) DEVICE — DRESSING, ADHESIVE, ISLAND, TELFA, 2 X 3.75 IN, LF

## (undated) DEVICE — FORCEPS, CADIERE, DAVINCI XI

## (undated) DEVICE — ACCESS PORT, 12MM, 120MM LENGTH, LOW PROFILE W/BLADELESS OPTICAL TIP

## (undated) DEVICE — SUTURE, MONOCRYL, 4-0, 27 IN, PS-2, UNDYED

## (undated) DEVICE — DRAPE, ARM XI

## (undated) DEVICE — POSITIONING KIT, PAGAZZI, PINK PAD XL, W/ ARM AND HEAD REST

## (undated) DEVICE — IRRIGATION SET, CYSTOSCOPY, REGULATING CLAMP, STRAIGHT, 81 IN

## (undated) DEVICE — SUTURE, VICRYL PLUS, 0, 27IN, UR-6, VIOLET, BRAIDED

## (undated) DEVICE — NERVE BLOCK, STIMUQUIK, SINGLE-SHOT, 21GA X 3-1/2

## (undated) DEVICE — GLOVE, SURGICAL, PROTEXIS PI MICRO, 6.5, PF, LF

## (undated) DEVICE — PUMP, STRYKERFLOW 2 & HANDPIECE W/10FT. IRRIGATION TUBING

## (undated) DEVICE — COVER, TIP HOT SHEARS ENDOWRIST

## (undated) DEVICE — Device

## (undated) DEVICE — LUBRICANT, ELECTROLUBE, F/ELECTRODE TIPS

## (undated) DEVICE — OBTURATOR, BLADELESS , SU

## (undated) DEVICE — TUBING SET, TRI-LUMEN, FILTERED, F/AIRSEAL

## (undated) DEVICE — TOWEL, SURGICAL, NEURO, O/R, 16 X 26, BLUE, STERILE

## (undated) DEVICE — CLEAN KIT, ANTIFOG SCOPE, SEE SHARP 150MM

## (undated) DEVICE — SEAL, UNIVERSAL, 5-12MM

## (undated) DEVICE — TRAY, FOLEY, LUBRI-SIL, 16FR, COMPLETE CARE W/STATLOCK

## (undated) DEVICE — SCISSORS, MONOPOLAR, CURVED, 8MM

## (undated) DEVICE — GOWN, SURGICAL, SMARTGOWN, XLARGE, STERILE

## (undated) DEVICE — SUTURE, STRATAFIX, SPIRAL PDS PLUS, 0, 12IN, 30CM, CT-1, VIOLET

## (undated) DEVICE — SYRINGE, 50 CC, LUER LOCK

## (undated) DEVICE — DRAPE, SHEET, THREE QUARTER, FAN FOLD, 57 X 77 IN

## (undated) DEVICE — DRAPE, COLUMN, DAVINCI XI

## (undated) DEVICE — GLOVE, SURGICAL, PROTEXIS PI MICRO, 6.0, PF, LF

## (undated) DEVICE — DRIVER, NEEDLE, MEGA SUTURE CUT, DAVINCI XI

## (undated) DEVICE — DRAPE, UNDERBUTTOCKS, W / 27IN FLUID POUCH

## (undated) DEVICE — FORCEPS, BIPOLAR FENESTRATED XI

## (undated) DEVICE — NEEDLE, SPINAL, 22 G X 3.5 IN, BLACK HUB